# Patient Record
Sex: FEMALE | Race: WHITE | NOT HISPANIC OR LATINO | URBAN - METROPOLITAN AREA
[De-identification: names, ages, dates, MRNs, and addresses within clinical notes are randomized per-mention and may not be internally consistent; named-entity substitution may affect disease eponyms.]

---

## 2018-04-04 ENCOUNTER — OUTPATIENT (OUTPATIENT)
Dept: OUTPATIENT SERVICES | Facility: HOSPITAL | Age: 48
LOS: 1 days | End: 2018-04-04
Payer: COMMERCIAL

## 2018-04-04 VITALS
HEIGHT: 64 IN | RESPIRATION RATE: 20 BRPM | SYSTOLIC BLOOD PRESSURE: 110 MMHG | OXYGEN SATURATION: 98 % | TEMPERATURE: 99 F | WEIGHT: 126.99 LBS | HEART RATE: 91 BPM | DIASTOLIC BLOOD PRESSURE: 68 MMHG

## 2018-04-04 DIAGNOSIS — Z85.3 PERSONAL HISTORY OF MALIGNANT NEOPLASM OF BREAST: ICD-10-CM

## 2018-04-04 DIAGNOSIS — Z90.89 ACQUIRED ABSENCE OF OTHER ORGANS: Chronic | ICD-10-CM

## 2018-04-04 DIAGNOSIS — C50.919 MALIGNANT NEOPLASM OF UNSPECIFIED SITE OF UNSPECIFIED FEMALE BREAST: ICD-10-CM

## 2018-04-04 DIAGNOSIS — Z01.818 ENCOUNTER FOR OTHER PREPROCEDURAL EXAMINATION: ICD-10-CM

## 2018-04-04 DIAGNOSIS — Z92.89 PERSONAL HISTORY OF OTHER MEDICAL TREATMENT: Chronic | ICD-10-CM

## 2018-04-04 LAB
ANION GAP SERPL CALC-SCNC: 9 MMOL/L — SIGNIFICANT CHANGE UP (ref 5–17)
BUN SERPL-MCNC: 17 MG/DL — SIGNIFICANT CHANGE UP (ref 7–23)
CALCIUM SERPL-MCNC: 9.4 MG/DL — SIGNIFICANT CHANGE UP (ref 8.4–10.5)
CHLORIDE SERPL-SCNC: 102 MMOL/L — SIGNIFICANT CHANGE UP (ref 96–108)
CO2 SERPL-SCNC: 27 MMOL/L — SIGNIFICANT CHANGE UP (ref 22–31)
CREAT SERPL-MCNC: 0.93 MG/DL — SIGNIFICANT CHANGE UP (ref 0.5–1.3)
GLUCOSE SERPL-MCNC: 91 MG/DL — SIGNIFICANT CHANGE UP (ref 70–99)
HCT VFR BLD CALC: 39.6 % — SIGNIFICANT CHANGE UP (ref 34.5–45)
HGB BLD-MCNC: 12.6 G/DL — SIGNIFICANT CHANGE UP (ref 11.5–15.5)
MCHC RBC-ENTMCNC: 29.4 PG — SIGNIFICANT CHANGE UP (ref 27–34)
MCHC RBC-ENTMCNC: 31.8 GM/DL — LOW (ref 32–36)
MCV RBC AUTO: 92.5 FL — SIGNIFICANT CHANGE UP (ref 80–100)
NRBC # BLD: 0 /100 WBCS — SIGNIFICANT CHANGE UP (ref 0–0)
PLATELET # BLD AUTO: 317 K/UL — SIGNIFICANT CHANGE UP (ref 150–400)
POTASSIUM SERPL-MCNC: 3.9 MMOL/L — SIGNIFICANT CHANGE UP (ref 3.5–5.3)
POTASSIUM SERPL-SCNC: 3.9 MMOL/L — SIGNIFICANT CHANGE UP (ref 3.5–5.3)
RBC # BLD: 4.28 M/UL — SIGNIFICANT CHANGE UP (ref 3.8–5.2)
RBC # FLD: 13.3 % — SIGNIFICANT CHANGE UP (ref 10.3–14.5)
SODIUM SERPL-SCNC: 138 MMOL/L — SIGNIFICANT CHANGE UP (ref 135–145)
WBC # BLD: 6.77 K/UL — SIGNIFICANT CHANGE UP (ref 3.8–10.5)
WBC # FLD AUTO: 6.77 K/UL — SIGNIFICANT CHANGE UP (ref 3.8–10.5)

## 2018-04-04 PROCEDURE — G0463: CPT

## 2018-04-04 PROCEDURE — 85027 COMPLETE CBC AUTOMATED: CPT

## 2018-04-04 PROCEDURE — 80048 BASIC METABOLIC PNL TOTAL CA: CPT

## 2018-04-04 NOTE — H&P PST ADULT - RS GEN PE MLT RESP DETAILS PC
breath sounds equal/respirations non-labored/normal/airway patent/good air movement/clear to auscultation bilaterally

## 2018-04-04 NOTE — H&P PST ADULT - NSANTHOSAYNRD_GEN_A_CORE
No. LIZA screening performed.  STOP BANG Legend: 0-2 = LOW Risk; 3-4 = INTERMEDIATE Risk; 5-8 = HIGH Risk

## 2018-04-04 NOTE — H&P PST ADULT - MUSCULOSKELETAL
negative detailed exam normal/ROM intact/no calf tenderness/no joint swelling/no joint erythema/no joint warmth

## 2018-04-04 NOTE — H&P PST ADULT - ATTENDING COMMENTS
48 year old female recently diagnosed right breast multicentral breast cancer.  She presents to undergo a right areolar sparing total mastectomy, right axillary sentinel lymph node biopsy, possible right axillary lymph node dissection and tissue expander.

## 2018-04-04 NOTE — H&P PST ADULT - PROBLEM SELECTOR PLAN 1
Bilateral Total Mastectomy ,Bilateral Axillary [place] Lymph Node Biopsy, Possible Bilateral Axillary Lymph node Dissection ,Bilateral Tissue Expander with Alloderm  on 4/16/2018  -Pre- Op instructions discussed   -CBC , BMP sent  - [facility] ordered the DOS right Total Mastectomy ,right Axillary [place] Lymph Node Biopsy, Possible right Axillary Lymph node Dissection ,right Tissue Expander with Alloderm  on 4/16/2018  -Pre- Op instructions discussed   -CBC , BMP sent  - UCG ordered the DOS

## 2018-04-04 NOTE — H&P PST ADULT - HISTORY OF PRESENT ILLNESS
49 y/o female with no significant past medical history ,recently diagnosed with neoplasm of right breast . Presents to  PST for scheduled Bilateral Total Mastectomy ,Bilateral Axillary [place] Lymph Node Biopsy, Possible Bilateral Axillary Lymph node Dissection ,Bilateral Tissue Expander with Alloderm  on 4/16/2018 47 y/o female with no significant past medical history ,recently diagnosed with neoplasm of right breast . Presents to  PST for scheduled Right  Total Mastectomy ,right Axillary [place] Lymph Node Biopsy, Possible right Axillary Lymph node Dissection ,right Tissue Expander with Alloderm  on 4/16/2018

## 2018-04-12 ENCOUNTER — RESULT REVIEW (OUTPATIENT)
Age: 48
End: 2018-04-12

## 2018-04-15 ENCOUNTER — TRANSCRIPTION ENCOUNTER (OUTPATIENT)
Age: 48
End: 2018-04-15

## 2018-04-16 ENCOUNTER — RESULT REVIEW (OUTPATIENT)
Age: 48
End: 2018-04-16

## 2018-04-16 ENCOUNTER — APPOINTMENT (OUTPATIENT)
Dept: NUCLEAR MEDICINE | Facility: HOSPITAL | Age: 48
End: 2018-04-16

## 2018-04-16 ENCOUNTER — INPATIENT (INPATIENT)
Facility: HOSPITAL | Age: 48
LOS: 0 days | Discharge: HOME | DRG: 581 | End: 2018-04-17
Attending: SURGERY | Admitting: SURGERY
Payer: COMMERCIAL

## 2018-04-16 VITALS
HEIGHT: 64 IN | RESPIRATION RATE: 20 BRPM | TEMPERATURE: 98 F | DIASTOLIC BLOOD PRESSURE: 74 MMHG | OXYGEN SATURATION: 98 % | HEART RATE: 78 BPM | WEIGHT: 126.99 LBS | SYSTOLIC BLOOD PRESSURE: 104 MMHG

## 2018-04-16 DIAGNOSIS — Z85.3 PERSONAL HISTORY OF MALIGNANT NEOPLASM OF BREAST: ICD-10-CM

## 2018-04-16 DIAGNOSIS — Z90.89 ACQUIRED ABSENCE OF OTHER ORGANS: Chronic | ICD-10-CM

## 2018-04-16 DIAGNOSIS — Z92.89 PERSONAL HISTORY OF OTHER MEDICAL TREATMENT: Chronic | ICD-10-CM

## 2018-04-16 PROBLEM — Z00.00 ENCOUNTER FOR PREVENTIVE HEALTH EXAMINATION: Status: ACTIVE | Noted: 2018-04-16

## 2018-04-16 LAB — HCG UR QL: NEGATIVE — SIGNIFICANT CHANGE UP

## 2018-04-16 PROCEDURE — 88331 PATH CONSLTJ SURG 1 BLK 1SPC: CPT | Mod: 26

## 2018-04-16 PROCEDURE — 88305 TISSUE EXAM BY PATHOLOGIST: CPT | Mod: 26

## 2018-04-16 PROCEDURE — 88342 IMHCHEM/IMCYTCHM 1ST ANTB: CPT | Mod: 26

## 2018-04-16 PROCEDURE — 88307 TISSUE EXAM BY PATHOLOGIST: CPT | Mod: 26

## 2018-04-16 PROCEDURE — 88341 IMHCHEM/IMCYTCHM EA ADD ANTB: CPT | Mod: 26

## 2018-04-16 RX ORDER — OXYCODONE HYDROCHLORIDE 5 MG/1
5 TABLET ORAL EVERY 6 HOURS
Qty: 0 | Refills: 0 | Status: DISCONTINUED | OUTPATIENT
Start: 2018-04-16 | End: 2018-04-17

## 2018-04-16 RX ORDER — CEFAZOLIN SODIUM 1 G
2000 VIAL (EA) INJECTION ONCE
Qty: 0 | Refills: 0 | Status: COMPLETED | OUTPATIENT
Start: 2018-04-16 | End: 2018-04-16

## 2018-04-16 RX ORDER — HYDROMORPHONE HYDROCHLORIDE 2 MG/ML
0.5 INJECTION INTRAMUSCULAR; INTRAVENOUS; SUBCUTANEOUS EVERY 4 HOURS
Qty: 0 | Refills: 0 | Status: DISCONTINUED | OUTPATIENT
Start: 2018-04-16 | End: 2018-04-17

## 2018-04-16 RX ORDER — SODIUM CHLORIDE 9 MG/ML
3 INJECTION INTRAMUSCULAR; INTRAVENOUS; SUBCUTANEOUS EVERY 8 HOURS
Qty: 0 | Refills: 0 | Status: DISCONTINUED | OUTPATIENT
Start: 2018-04-16 | End: 2018-04-16

## 2018-04-16 RX ORDER — OXYCODONE HYDROCHLORIDE 5 MG/1
10 TABLET ORAL EVERY 6 HOURS
Qty: 0 | Refills: 0 | Status: DISCONTINUED | OUTPATIENT
Start: 2018-04-16 | End: 2018-04-17

## 2018-04-16 RX ORDER — HEPARIN SODIUM 5000 [USP'U]/ML
5000 INJECTION INTRAVENOUS; SUBCUTANEOUS EVERY 8 HOURS
Qty: 0 | Refills: 0 | Status: DISCONTINUED | OUTPATIENT
Start: 2018-04-16 | End: 2018-04-17

## 2018-04-16 RX ORDER — DOCUSATE SODIUM 100 MG
100 CAPSULE ORAL THREE TIMES A DAY
Qty: 0 | Refills: 0 | Status: DISCONTINUED | OUTPATIENT
Start: 2018-04-16 | End: 2018-04-17

## 2018-04-16 RX ORDER — ONDANSETRON 8 MG/1
4 TABLET, FILM COATED ORAL ONCE
Qty: 0 | Refills: 0 | Status: DISCONTINUED | OUTPATIENT
Start: 2018-04-16 | End: 2018-04-16

## 2018-04-16 RX ORDER — HYDROMORPHONE HYDROCHLORIDE 2 MG/ML
0.5 INJECTION INTRAMUSCULAR; INTRAVENOUS; SUBCUTANEOUS
Qty: 0 | Refills: 0 | Status: DISCONTINUED | OUTPATIENT
Start: 2018-04-16 | End: 2018-04-16

## 2018-04-16 RX ORDER — ACETAMINOPHEN 500 MG
975 TABLET ORAL EVERY 8 HOURS
Qty: 0 | Refills: 0 | Status: DISCONTINUED | OUTPATIENT
Start: 2018-04-17 | End: 2018-04-17

## 2018-04-16 RX ORDER — CEFAZOLIN SODIUM 1 G
2000 VIAL (EA) INJECTION EVERY 8 HOURS
Qty: 0 | Refills: 0 | Status: COMPLETED | OUTPATIENT
Start: 2018-04-16 | End: 2018-04-17

## 2018-04-16 RX ORDER — SODIUM CHLORIDE 9 MG/ML
1000 INJECTION, SOLUTION INTRAVENOUS
Qty: 0 | Refills: 0 | Status: DISCONTINUED | OUTPATIENT
Start: 2018-04-16 | End: 2018-04-17

## 2018-04-16 RX ORDER — LIDOCAINE HCL 20 MG/ML
0.2 VIAL (ML) INJECTION ONCE
Qty: 0 | Refills: 0 | Status: COMPLETED | OUTPATIENT
Start: 2018-04-16 | End: 2018-04-16

## 2018-04-16 RX ADMIN — HEPARIN SODIUM 5000 UNIT(S): 5000 INJECTION INTRAVENOUS; SUBCUTANEOUS at 22:17

## 2018-04-16 RX ADMIN — HYDROMORPHONE HYDROCHLORIDE 0.5 MILLIGRAM(S): 2 INJECTION INTRAMUSCULAR; INTRAVENOUS; SUBCUTANEOUS at 18:20

## 2018-04-16 RX ADMIN — Medication 100 MILLIGRAM(S): at 22:16

## 2018-04-16 RX ADMIN — SODIUM CHLORIDE 75 MILLILITER(S): 9 INJECTION, SOLUTION INTRAVENOUS at 19:37

## 2018-04-16 RX ADMIN — Medication 100 MILLIGRAM(S): at 22:17

## 2018-04-16 RX ADMIN — HYDROMORPHONE HYDROCHLORIDE 0.5 MILLIGRAM(S): 2 INJECTION INTRAMUSCULAR; INTRAVENOUS; SUBCUTANEOUS at 18:05

## 2018-04-16 NOTE — PROGRESS NOTE ADULT - SUBJECTIVE AND OBJECTIVE BOX
Surgery Post-Op Note    Procedure: Mastectomy, with axillary sentinel node biopsy    Surgeon: Dr. Palleschi    Subjective:   Pt seen and examined at the bedside. Pt w/ no complaints. Denies F/C/N/V. Pain controlled with medication.     Vital Signs Last 24 Hrs  T(C): 37.4 (16 Apr 2018 18:30), Max: 37.4 (16 Apr 2018 17:30)  T(F): 99.3 (16 Apr 2018 18:30), Max: 99.3 (16 Apr 2018 17:30)  HR: 72 (16 Apr 2018 19:30) (72 - 94)  BP: 90/52 (16 Apr 2018 19:30) (90/52 - 104/74)  BP(mean): 65 (16 Apr 2018 19:30) (65 - 71)  RR: 16 (16 Apr 2018 19:30) (14 - 20)  SpO2: 96% (16 Apr 2018 19:30) (95% - 99%)    Physical Exam:  General: NAD, resting comfortably in bed  Neuro: A/O x 3, no focal deficits  Pulmonary: Nonlabored breathing, no respiratory distress  Cardiovascular: NSR  Chest: breast incisions c,d,i.  No hematoma appreciated.  RADU x2 with serosanguinous output  Abdominal: soft, NT. ND  Extremities: WWP        Assessment:48y Female s/p Right Mastectomy, with axillary sentinel node biopsy with plastics recon.  Plan:  IVF, Diet: Advance as tolerated  Pain/nausea control PRN  f/u TOV  Incentive spirometer/OOB/Ambulate

## 2018-04-16 NOTE — BRIEF OPERATIVE NOTE - PRE-OP DX
Malignant neoplasm of upper-inner quadrant of right breast in female, estrogen receptor positive  04/16/2018  multicentric, right 1:00 and right 2-3:00  Active  Palleschi, Susan M

## 2018-04-16 NOTE — BRIEF OPERATIVE NOTE - PROCEDURE
<<-----Click on this checkbox to enter Procedure Mastectomy, with axillary sentinel node biopsy  04/16/2018  right, areolar sparing  [facility]  [facility]

## 2018-04-16 NOTE — BRIEF OPERATIVE NOTE - SPECIMENS
right axillary sentinel lymph nodes x 3, right breast, right breast core biopsy scars x 2 (1:00, 2-3:00)

## 2018-04-17 ENCOUNTER — TRANSCRIPTION ENCOUNTER (OUTPATIENT)
Age: 48
End: 2018-04-17

## 2018-04-17 VITALS
OXYGEN SATURATION: 97 % | DIASTOLIC BLOOD PRESSURE: 58 MMHG | HEART RATE: 67 BPM | RESPIRATION RATE: 16 BRPM | TEMPERATURE: 99 F | SYSTOLIC BLOOD PRESSURE: 94 MMHG

## 2018-04-17 PROCEDURE — C1789: CPT

## 2018-04-17 PROCEDURE — A9541: CPT

## 2018-04-17 PROCEDURE — 88307 TISSUE EXAM BY PATHOLOGIST: CPT

## 2018-04-17 PROCEDURE — 88305 TISSUE EXAM BY PATHOLOGIST: CPT

## 2018-04-17 PROCEDURE — 81025 URINE PREGNANCY TEST: CPT

## 2018-04-17 PROCEDURE — 88331 PATH CONSLTJ SURG 1 BLK 1SPC: CPT

## 2018-04-17 PROCEDURE — 88341 IMHCHEM/IMCYTCHM EA ADD ANTB: CPT

## 2018-04-17 PROCEDURE — 99261: CPT

## 2018-04-17 RX ORDER — OXYCODONE HYDROCHLORIDE 5 MG/1
5 TABLET ORAL EVERY 4 HOURS
Qty: 0 | Refills: 0 | Status: DISCONTINUED | OUTPATIENT
Start: 2018-04-17 | End: 2018-04-17

## 2018-04-17 RX ORDER — ACETAMINOPHEN 500 MG
3 TABLET ORAL
Qty: 0 | Refills: 0 | DISCHARGE
Start: 2018-04-17

## 2018-04-17 RX ORDER — DOCUSATE SODIUM 100 MG
1 CAPSULE ORAL
Qty: 0 | Refills: 0 | DISCHARGE
Start: 2018-04-17

## 2018-04-17 RX ADMIN — Medication 100 MILLIGRAM(S): at 05:16

## 2018-04-17 RX ADMIN — HEPARIN SODIUM 5000 UNIT(S): 5000 INJECTION INTRAVENOUS; SUBCUTANEOUS at 05:16

## 2018-04-17 RX ADMIN — OXYCODONE HYDROCHLORIDE 5 MILLIGRAM(S): 5 TABLET ORAL at 05:45

## 2018-04-17 RX ADMIN — OXYCODONE HYDROCHLORIDE 5 MILLIGRAM(S): 5 TABLET ORAL at 05:16

## 2018-04-17 RX ADMIN — OXYCODONE HYDROCHLORIDE 10 MILLIGRAM(S): 5 TABLET ORAL at 08:55

## 2018-04-17 RX ADMIN — Medication 975 MILLIGRAM(S): at 09:03

## 2018-04-17 RX ADMIN — Medication 975 MILLIGRAM(S): at 00:32

## 2018-04-17 RX ADMIN — Medication 975 MILLIGRAM(S): at 01:09

## 2018-04-17 RX ADMIN — Medication 975 MILLIGRAM(S): at 08:33

## 2018-04-17 RX ADMIN — OXYCODONE HYDROCHLORIDE 10 MILLIGRAM(S): 5 TABLET ORAL at 09:25

## 2018-04-17 NOTE — PROGRESS NOTE ADULT - ASSESSMENT
48F s/p areola sparing mastectomy on right and reconstruction with tissue expander. POD 1.  >> Doing well.  >> Regular diet.  >> Ambulate as tolerated.  >> HOD elevation  >> Continue surgical bra  >> Dispo. planning from PRS point of view  >> Patient has prescriptions from her preoperative appointment  >> Follow up in Dr. Boss's office on Thursday.    [facility] Plastic Surgery Pager -- [phone]  [facility] Plastic Surgery Pager -- [phone]

## 2018-04-17 NOTE — DISCHARGE NOTE ADULT - PATIENT PORTAL LINK FT
You can access the [facility], offered by [facility], by registering with the following website: [email or web address]

## 2018-04-17 NOTE — PROGRESS NOTE ADULT - SUBJECTIVE AND OBJECTIVE BOX
SUBJECTIVE:  Doing well.   No overnight events.   Tolerating diet.  No complaints.    OBJECTIVE:     ** VITAL SIGNS / I&O's **    T(C): 37 (04-17-18 @ 08:54), Max: 37.4 (04-16-18 @ 17:30)  T(F): 98.6 (04-17-18 @ 08:54), Max: 99.3 (04-16-18 @ 17:30)  HR: 67 (04-17-18 @ 08:54) (67 - 94)  BP: 94/58 (04-17-18 @ 08:54) (90/52 - 104/74)  RR: 16 (04-17-18 @ 08:54) (14 - 20)  SpO2: 97% (04-17-18 @ 08:54) (95% - 99%)      16 Apr 2018 07:01  -  17 Apr 2018 07:00  --------------------------------------------------------  IN:    IV PiggyBack: 100 mL    lactated ringers.: 1125 mL    Oral Fluid: 120 mL  Total IN: 1345 mL    OUT:    Bulb: 10 mL    Bulb: 80 mL    Voided: 800 mL  Total OUT: 890 mL    Total NET: 455 mL      17 Apr 2018 07:01  -  17 Apr 2018 09:46  --------------------------------------------------------  IN:  Total IN: 0 mL    OUT:    Bulb: 50 mL  Total OUT: 50 mL    Total NET: -50 mL          ** PHYSICAL EXAM **     -- CONSTITUTIONAL: AOx3. NAD.   -- RIGHT BREAST: Mastectomy skin flap ecchymosis, minimal. No collections. Areola complex viable. Drain(s) serosanguinous.  -- CARDIOVASCULAR: Regular rate and rhythm. S1, S2.  -- RESPIRATORY: Bilateral breath sounds.   -- ABDOMEN: Soft. Non-tender. Non-distended.

## 2018-04-17 NOTE — DISCHARGE NOTE ADULT - MEDICATION SUMMARY - MEDICATIONS TO TAKE
I will START or STAY ON the medications listed below when I get home from the hospital:    acetaminophen 325 mg oral tablet  -- 3 tab(s) by mouth every 8 hours  -- Indication: For Mild pain    docusate sodium 100 mg oral capsule  -- 1 cap(s) by mouth 3 times a day  -- Indication: For Constipation as needed

## 2018-04-17 NOTE — PROGRESS NOTE ADULT - ASSESSMENT
Stable POD 1  Healing well  Feels ready to go home  Continue drains  Keep bra on and dry  Follow-up with Kaye at Dr. Boss's office Thursday  Reviewed with RN and surgery team

## 2018-04-17 NOTE — PROGRESS NOTE ADULT - ASSESSMENT
Assessment:48y Female s/p Right Mastectomy, with axillary sentinel node biopsy with plastics reconstruction.    Plan:  IVF, Diet: Advance as tolerated  Pain/nausea control PRN  f/u TOV  Incentive spirometer/OOB/Ambulate

## 2018-04-17 NOTE — DISCHARGE NOTE ADULT - PLAN OF CARE
return to daily living activity prior to surgery, postoperative recovery WOUND CARE:   BATHING: Please do not submerge wound underwater. You may shower and/or sponge bathe.  ACTIVITY: No heavy lifting or straining. Otherwise, you may return to your usual level of physical activity. If you are taking narcotic pain medication (such as Percocet), do NOT drive a car, operate machinery or make important decisions.  DIET: Return to your usual diet.  NOTIFY YOUR SURGEON IF: You have any bleeding that does not stop, any pus draining from your wound, any fever (over 100.4 F) or chills, persistent nausea/vomiting, persistent diarrhea, or if your pain is not controlled on your discharge pain medications.  FOLLOW-UP:  1. Follow-up with Plastic Surgery, Kaye at Dr. Boss's office on Thursday.  Please call office to confirm your appointment. Follow-up with general surgery in 1-2 weeks.  2. Please follow up with your primary care physician in one week regarding your hospitalization.

## 2018-04-17 NOTE — DISCHARGE NOTE ADULT - CARE PROVIDER_API CALL
Lavell Boss), Plastic Surgery  [address]  [address]  [place] [address]  Phone: [phone]  Fax: [phone]    Palleschi, Susan M (MD), Surgery  [address]  [address]  [place] [address]  Phone: [phone]  Fax: [phone]

## 2018-04-17 NOTE — DISCHARGE NOTE ADULT - CARE PLAN
Principal Discharge DX:	Malignant neoplasm of breast (female)  Goal:	return to daily living activity prior to surgery, postoperative recovery  Assessment and plan of treatment:	WOUND CARE:   BATHING: Please do not submerge wound underwater. You may shower and/or sponge bathe.  ACTIVITY: No heavy lifting or straining. Otherwise, you may return to your usual level of physical activity. If you are taking narcotic pain medication (such as Percocet), do NOT drive a car, operate machinery or make important decisions.  DIET: Return to your usual diet.  NOTIFY YOUR SURGEON IF: You have any bleeding that does not stop, any pus draining from your wound, any fever (over 100.4 F) or chills, persistent nausea/vomiting, persistent diarrhea, or if your pain is not controlled on your discharge pain medications.  FOLLOW-UP:  1. Follow-up with Plastic Surgery, Kaye at Dr. Boss's office on Thursday.  Please call office to confirm your appointment. Follow-up with general surgery in 1-2 weeks.  2. Please follow up with your primary care physician in one week regarding your hospitalization.

## 2018-04-17 NOTE — PROGRESS NOTE ADULT - SUBJECTIVE AND OBJECTIVE BOX
S: Patient with no events overnight; denies fevers, chills, nausea, emesis, chest pain, SOB.    O: Vital Signs Last 24 Hrs  T(C): 36.6 (17 Apr 2018 05:14), Max: 37.4 (16 Apr 2018 17:30)  T(F): 97.9 (17 Apr 2018 05:14), Max: 99.3 (16 Apr 2018 17:30)  HR: 71 (17 Apr 2018 05:14) (71 - 94)  BP: 94/58 (17 Apr 2018 05:14) (90/52 - 104/74)  BP(mean): 68 (16 Apr 2018 21:30) (62 - 71)  RR: 18 (17 Apr 2018 05:14) (14 - 20)  SpO2: 97% (17 Apr 2018 05:14) (95% - 99%)  I&O's Detail    16 Apr 2018 07:01  -  17 Apr 2018 05:54  --------------------------------------------------------  IN:    IV PiggyBack: 100 mL    lactated ringers.: 1125 mL    Oral Fluid: 120 mL  Total IN: 1345 mL    OUT:    Bulb: 40 mL    Voided: 800 mL  Total OUT: 840 mL    Total NET: 505 mL        General: alert and oriented, NAD  Resp: airway patent, respirations unlabored, incision CDI JPx2 SS  CVS: regular rate and rhythm  Abdomen: soft, nontender, nondistended  Extremities: no edema  Skin: warm, dry, appropriate color

## 2018-04-17 NOTE — PROGRESS NOTE ADULT - SUBJECTIVE AND OBJECTIVE BOX
NAD. Pain controlled with PO meds.  Afebrile, VSS  Right breast closures intact. Skin viable.  TE in good position.  Drains serosang.

## 2018-04-20 LAB — SURGICAL PATHOLOGY STUDY: SIGNIFICANT CHANGE UP

## 2018-08-13 ENCOUNTER — RESULT REVIEW (OUTPATIENT)
Age: 48
End: 2018-08-13

## 2019-09-06 ENCOUNTER — RESULT REVIEW (OUTPATIENT)
Age: 49
End: 2019-09-06

## 2020-09-03 ENCOUNTER — TRANSCRIPTION ENCOUNTER (OUTPATIENT)
Age: 50
End: 2020-09-03

## 2020-11-19 NOTE — H&P PST ADULT - VENOUS THROMBOEMBOLISM AGE
POST ANESTHESIA EVALUATION    24y Female POSTOP DAY 1 S/P     MENTAL STATUS: Patient participation [ x ] Awake     [  ] Arousable     [  ] Sedated    AIRWAY PATENCY: [ x ] Satisfactory  [  ] Other:     Vital Signs Last 24 Hrs  T(C): 36.8 (19 Nov 2020 11:00), Max: 36.8 (18 Nov 2020 15:11)  T(F): 98.2 (19 Nov 2020 11:00), Max: 98.2 (18 Nov 2020 15:11)  HR: 66 (19 Nov 2020 12:00) (49 - 92)  BP: 99/65 (19 Nov 2020 08:00) (96/50 - 101/61)  BP(mean): 73 (19 Nov 2020 08:00) (61 - 73)  RR: 13 (19 Nov 2020 12:00) (10 - 22)  SpO2: 100% (19 Nov 2020 12:00) (98% - 100%)  I&O's Summary    18 Nov 2020 07:01  -  19 Nov 2020 07:00  --------------------------------------------------------  IN: 1752 mL / OUT: 1405 mL / NET: 347 mL    19 Nov 2020 07:01  -  19 Nov 2020 13:20  --------------------------------------------------------  IN: 594.8 mL / OUT: 784 mL / NET: -189.2 mL          NAUSEA/ VOMITTING:  [  ] NONE  [ x ] CONTROLLED [  ] OTHER     PAIN: [  x] CONTROLLED WITH CURRENT REGIMEN  [  ] OTHER    [ x ] NO APPARENT ANESTHESIA COMPLICATIONS      Comments:
40-59 yrs

## 2021-02-11 ENCOUNTER — RESULT REVIEW (OUTPATIENT)
Age: 51
End: 2021-02-11

## 2022-12-02 NOTE — H&P PST ADULT - BP NONINVASIVE SYSTOLIC (MM HG)
Patient's mom, Celina is concern about Umza's lab results. Mom is with Uzma right now at her school.  She  would like to speak with  Dr. Bridges's partner today, if possible  since Dr. Bridges will not be back in the office until Monday.    Thank you.     110

## 2022-12-17 ENCOUNTER — EMERGENCY (EMERGENCY)
Facility: HOSPITAL | Age: 52
LOS: 1 days | Discharge: HOME | End: 2022-12-17
Attending: EMERGENCY MEDICINE
Payer: COMMERCIAL

## 2022-12-17 VITALS
SYSTOLIC BLOOD PRESSURE: 137 MMHG | RESPIRATION RATE: 18 BRPM | DIASTOLIC BLOOD PRESSURE: 86 MMHG | HEART RATE: 91 BPM | OXYGEN SATURATION: 98 % | TEMPERATURE: 98 F | WEIGHT: 126.99 LBS | HEIGHT: 64 IN

## 2022-12-17 VITALS
DIASTOLIC BLOOD PRESSURE: 63 MMHG | RESPIRATION RATE: 16 BRPM | OXYGEN SATURATION: 97 % | SYSTOLIC BLOOD PRESSURE: 100 MMHG | HEART RATE: 72 BPM | TEMPERATURE: 99 F

## 2022-12-17 DIAGNOSIS — Z90.89 ACQUIRED ABSENCE OF OTHER ORGANS: Chronic | ICD-10-CM

## 2022-12-17 DIAGNOSIS — Z92.89 PERSONAL HISTORY OF OTHER MEDICAL TREATMENT: Chronic | ICD-10-CM

## 2022-12-17 LAB
ALBUMIN SERPL ELPH-MCNC: 4 G/DL — SIGNIFICANT CHANGE UP (ref 3.3–5)
ALP SERPL-CCNC: 34 U/L — LOW (ref 40–120)
ALT FLD-CCNC: 13 U/L — SIGNIFICANT CHANGE UP (ref 10–45)
ANION GAP SERPL CALC-SCNC: 13 MMOL/L — SIGNIFICANT CHANGE UP (ref 5–17)
APPEARANCE UR: CLEAR — SIGNIFICANT CHANGE UP
AST SERPL-CCNC: 16 U/L — SIGNIFICANT CHANGE UP (ref 10–40)
BACTERIA # UR AUTO: NEGATIVE — SIGNIFICANT CHANGE UP
BASOPHILS # BLD AUTO: 0.03 K/UL — SIGNIFICANT CHANGE UP (ref 0–0.2)
BASOPHILS NFR BLD AUTO: 0.4 % — SIGNIFICANT CHANGE UP (ref 0–2)
BILIRUB SERPL-MCNC: 0.2 MG/DL — SIGNIFICANT CHANGE UP (ref 0.2–1.2)
BILIRUB UR-MCNC: NEGATIVE — SIGNIFICANT CHANGE UP
BUN SERPL-MCNC: 16 MG/DL — SIGNIFICANT CHANGE UP (ref 7–23)
CALCIUM SERPL-MCNC: 8.7 MG/DL — SIGNIFICANT CHANGE UP (ref 8.4–10.5)
CHLORIDE SERPL-SCNC: 104 MMOL/L — SIGNIFICANT CHANGE UP (ref 96–108)
CO2 SERPL-SCNC: 22 MMOL/L — SIGNIFICANT CHANGE UP (ref 22–31)
COLOR SPEC: COLORLESS — SIGNIFICANT CHANGE UP
CREAT SERPL-MCNC: 0.97 MG/DL — SIGNIFICANT CHANGE UP (ref 0.5–1.3)
DIFF PNL FLD: ABNORMAL
EGFR: 71 ML/MIN/1.73M2 — SIGNIFICANT CHANGE UP
EOSINOPHIL # BLD AUTO: 0.05 K/UL — SIGNIFICANT CHANGE UP (ref 0–0.5)
EOSINOPHIL NFR BLD AUTO: 0.6 % — SIGNIFICANT CHANGE UP (ref 0–6)
EPI CELLS # UR: 0 /HPF — SIGNIFICANT CHANGE UP
GLUCOSE SERPL-MCNC: 104 MG/DL — HIGH (ref 70–99)
GLUCOSE UR QL: NEGATIVE — SIGNIFICANT CHANGE UP
HCT VFR BLD CALC: 38.8 % — SIGNIFICANT CHANGE UP (ref 34.5–45)
HGB BLD-MCNC: 12.7 G/DL — SIGNIFICANT CHANGE UP (ref 11.5–15.5)
HYALINE CASTS # UR AUTO: 0 /LPF — SIGNIFICANT CHANGE UP (ref 0–2)
IMM GRANULOCYTES NFR BLD AUTO: 0.2 % — SIGNIFICANT CHANGE UP (ref 0–0.9)
KETONES UR-MCNC: NEGATIVE — SIGNIFICANT CHANGE UP
LEUKOCYTE ESTERASE UR-ACNC: NEGATIVE — SIGNIFICANT CHANGE UP
LYMPHOCYTES # BLD AUTO: 1.1 K/UL — SIGNIFICANT CHANGE UP (ref 1–3.3)
LYMPHOCYTES # BLD AUTO: 13.5 % — SIGNIFICANT CHANGE UP (ref 13–44)
MCHC RBC-ENTMCNC: 29.3 PG — SIGNIFICANT CHANGE UP (ref 27–34)
MCHC RBC-ENTMCNC: 32.7 GM/DL — SIGNIFICANT CHANGE UP (ref 32–36)
MCV RBC AUTO: 89.6 FL — SIGNIFICANT CHANGE UP (ref 80–100)
MONOCYTES # BLD AUTO: 0.62 K/UL — SIGNIFICANT CHANGE UP (ref 0–0.9)
MONOCYTES NFR BLD AUTO: 7.6 % — SIGNIFICANT CHANGE UP (ref 2–14)
NEUTROPHILS # BLD AUTO: 6.33 K/UL — SIGNIFICANT CHANGE UP (ref 1.8–7.4)
NEUTROPHILS NFR BLD AUTO: 77.7 % — HIGH (ref 43–77)
NITRITE UR-MCNC: NEGATIVE — SIGNIFICANT CHANGE UP
NRBC # BLD: 0 /100 WBCS — SIGNIFICANT CHANGE UP (ref 0–0)
PH UR: 6.5 — SIGNIFICANT CHANGE UP (ref 5–8)
PLATELET # BLD AUTO: 242 K/UL — SIGNIFICANT CHANGE UP (ref 150–400)
POTASSIUM SERPL-MCNC: 3.5 MMOL/L — SIGNIFICANT CHANGE UP (ref 3.5–5.3)
POTASSIUM SERPL-SCNC: 3.5 MMOL/L — SIGNIFICANT CHANGE UP (ref 3.5–5.3)
PROT SERPL-MCNC: 6.6 G/DL — SIGNIFICANT CHANGE UP (ref 6–8.3)
PROT UR-MCNC: NEGATIVE — SIGNIFICANT CHANGE UP
RBC # BLD: 4.33 M/UL — SIGNIFICANT CHANGE UP (ref 3.8–5.2)
RBC # FLD: 13.2 % — SIGNIFICANT CHANGE UP (ref 10.3–14.5)
RBC CASTS # UR COMP ASSIST: 1 /HPF — SIGNIFICANT CHANGE UP (ref 0–4)
SODIUM SERPL-SCNC: 139 MMOL/L — SIGNIFICANT CHANGE UP (ref 135–145)
SP GR SPEC: 1.01 — LOW (ref 1.01–1.02)
UROBILINOGEN FLD QL: NEGATIVE — SIGNIFICANT CHANGE UP
WBC # BLD: 8.15 K/UL — SIGNIFICANT CHANGE UP (ref 3.8–10.5)
WBC # FLD AUTO: 8.15 K/UL — SIGNIFICANT CHANGE UP (ref 3.8–10.5)
WBC UR QL: 0 /HPF — SIGNIFICANT CHANGE UP (ref 0–5)

## 2022-12-17 PROCEDURE — 99284 EMERGENCY DEPT VISIT MOD MDM: CPT | Mod: 25

## 2022-12-17 PROCEDURE — 51702 INSERT TEMP BLADDER CATH: CPT | Mod: XU

## 2022-12-17 PROCEDURE — 36415 COLL VENOUS BLD VENIPUNCTURE: CPT

## 2022-12-17 PROCEDURE — 81001 URINALYSIS AUTO W/SCOPE: CPT

## 2022-12-17 PROCEDURE — 76770 US EXAM ABDO BACK WALL COMP: CPT | Mod: 26

## 2022-12-17 PROCEDURE — 99285 EMERGENCY DEPT VISIT HI MDM: CPT

## 2022-12-17 PROCEDURE — 76770 US EXAM ABDO BACK WALL COMP: CPT

## 2022-12-17 PROCEDURE — 87086 URINE CULTURE/COLONY COUNT: CPT

## 2022-12-17 PROCEDURE — 80053 COMPREHEN METABOLIC PANEL: CPT

## 2022-12-17 PROCEDURE — 85025 COMPLETE CBC W/AUTO DIFF WBC: CPT

## 2022-12-17 NOTE — ED PROVIDER NOTE - NSFOLLOWUPINSTRUCTIONS_ED_ALL_ED_FT
You were seen in the Emergency Department for urinary retention. Your blood work did not show any emergent medical problems that require hospitalization or inpatient treatment. Follow up with urology in 7 days for trial of void and coleman removal as discussed.     1) Continue all previously prescribed medications as directed.    2) Follow up with your primary care physician - take copies of your results.    3) Return to the Emergency Department for worsening or persistent symptoms, and/or ANY NEW OR CONCERNING SYMPTOMS.

## 2022-12-17 NOTE — ED PROVIDER NOTE - PROGRESS NOTE DETAILS
LILY Aguero (PGY-3) - pt w/ 1L out in coleman. Labs not actionable. Observed in ED w/o further excessive diuresis. Will dc w/ uro follow up for trial of void.

## 2022-12-17 NOTE — ED PROVIDER NOTE - NSFOLLOWUPCLINICS_GEN_ALL_ED_FT
[facility]  Urology  [address] [place] [address] - 3rd [place] [address]  Phone: [phone]  Fax:

## 2022-12-17 NOTE — ED ADULT TRIAGE NOTE - CHIEF COMPLAINT QUOTE
retention since this afternoon, also reports spotting x 1 week, history of fibroids. reports several episodes over the last few months.

## 2022-12-17 NOTE — ED PROVIDER NOTE - NSICDXPASTMEDICALHX_GEN_ALL_CORE_FT
PAST MEDICAL HISTORY:  Malignant neoplasm of breast (female) Right Dx  2/2018    Pneumonia history - 2015 treated with ABT

## 2022-12-17 NOTE — ED PROVIDER NOTE - PATIENT PORTAL LINK FT
You can access the FollowMyHealth Patient Portal offered by [facility] by registering at the following website: [email or web address]. By joining [employer]’s FollowMyHealth portal, you will also be able to view your health information using other applications (apps) compatible with our system.

## 2022-12-17 NOTE — ED PROVIDER NOTE - CLINICAL SUMMARY MEDICAL DECISION MAKING FREE TEXT BOX
Attending MD Chavez:   52-year-old woman with a history of breast cancer, fibroids presenting for evaluation of difficulty urinating for several hours today.  Patient is having pelvic pressure and vaginal spotting associated with her difficulty urinating.  She denies any hematuria prior to her difficulty urinating.  There are no fevers or chills.  She has no back pain or lower extremity paresthesias.  She has never required a Clark catheter for urinary retention in the past.  She states she had a similar episode of near urinary retention several weeks ago that resolved on its own without presenting to the hospital.    Vital signs within normal limits.  The patient is uncomfortable appearing shifting in the bed frequently.  She is awake and alert oriented x3.  Abdomen with suprapubic fullness and tenderness otherwise nontender.  There is no CVA tenderness.  Patient moves all extremities spontaneously with full strength.    Point-of-care ultrasound reveals about 600 cc or more in the bladder.  Will place Clark catheter for acute urinary retention.  Will obtain screening urinalysis to rule out cystitis, screening labs to rule out significant renal dysfunction.  Etiology of urinary retention may be related to patient's known fibroid uterus.      *The above represents an initial assessment/impression. Please refer to progress notes for potential changes in patient clinical course*

## 2022-12-17 NOTE — ED ADULT NURSE NOTE - OBJECTIVE STATEMENT
52F aaox4 ambulatory with h/o Uterine fibroids and rt breast cancer with mastectomy and left breast reconstruction surgery p/w c/o acute urinary retention. Last urination was only few drops this afternoon, in the ED patient c/o 10/10 pain in the bladder, unable to sit because of the pain. On exam, bladder hard and distended. US done by Dr Chavez with >600 in the sonogram. [place] 16 Urinary catheter inserted aseptically, UA and labs sent pending results.   Patient verbalized improvement after the catheterization.

## 2022-12-17 NOTE — ED PROVIDER NOTE - ATTENDING CONTRIBUTION TO CARE
Attending MD Chavez:  I personally have seen and examined this patient. I have performed a substantive portion of the visit including all aspects of the medical decision making.  Resident note reviewed and agree on plan of care and except where noted.              *The above represents an initial assessment/impression. Please refer to progress notes for potential changes in patient clinical course*

## 2022-12-17 NOTE — ED PROVIDER NOTE - OBJECTIVE STATEMENT
52-year-old female history of right-sided breast cancer status post mastectomy on tamoxifen, fibroids here for urinary retention.  Patient states that she has had urinary retention before 2 times without seeking medical attention.  Today last urinated normal at 1 PM and then started having abdominal pain around 2:30 PM.  Denies back pain, dysuria.  She has never seen a urologist.  Otherwise denies fever, chest pain, shortness of breath, nausea vomiting.

## 2022-12-17 NOTE — ED PROVIDER NOTE - PHYSICAL EXAMINATION
General: NAD  HEENT: NCAT  Cardiac: RRR, 2+ radial pulses  Chest: CTA  Abdomen: soft, +ttp suprapubically -cva ttp  Extremities: no peripheral edema or calf tenderness  Skin: no rashes  Neuro: AAOx3, motor and sensory grossly intact  Psych: mood and affect appropriate

## 2022-12-18 PROBLEM — J18.9 PNEUMONIA, UNSPECIFIED ORGANISM: Chronic | Status: ACTIVE | Noted: 2018-04-04

## 2022-12-18 PROBLEM — C50.919 MALIGNANT NEOPLASM OF UNSPECIFIED SITE OF UNSPECIFIED FEMALE BREAST: Chronic | Status: ACTIVE | Noted: 2018-04-04

## 2022-12-18 LAB
CULTURE RESULTS: NO GROWTH — SIGNIFICANT CHANGE UP
SPECIMEN SOURCE: SIGNIFICANT CHANGE UP

## 2023-06-09 ENCOUNTER — APPOINTMENT (OUTPATIENT)
Dept: OBGYN | Facility: CLINIC | Age: 53
End: 2023-06-09
Payer: COMMERCIAL

## 2023-06-09 VITALS
SYSTOLIC BLOOD PRESSURE: 108 MMHG | BODY MASS INDEX: 22.2 KG/M2 | DIASTOLIC BLOOD PRESSURE: 74 MMHG | HEIGHT: 64 IN | WEIGHT: 130 LBS

## 2023-06-09 DIAGNOSIS — D25.0 INTRAMURAL LEIOMYOMA OF UTERUS: ICD-10-CM

## 2023-06-09 DIAGNOSIS — D25.1 INTRAMURAL LEIOMYOMA OF UTERUS: ICD-10-CM

## 2023-06-09 PROCEDURE — 99203 OFFICE O/P NEW LOW 30 MIN: CPT

## 2023-06-13 NOTE — PLAN
Patient returning call      [FreeTextEntry1] : Discussed the option of continued conservative observation of fibroids vs surgical removal. Treatment options of myomectomy, hysterectomy, ufe and continued observation were also outlined. A detailed discussion regarding the option of myomectomy vs hysterectomy was also held and the risks, benefits, and alternatives provided. All questions answered and pt to notify.\par pt to observe at this time and to have fu pelvic sono.\par During this visit 40 minutes were spent face-to-face with greater than 50% of the time dedicated to counseling.\par

## 2023-06-13 NOTE — PHYSICAL EXAM
[Chaperone Present] : A chaperone was present in the examining room during all aspects of the physical examination [FreeTextEntry1] : laura [Soft] : soft [Non-tender] : non-tender [No Mass] : no mass [Labia Majora] : normal [Labia Minora] : normal [Normal] : normal [Uterine Adnexae] : normal [FreeTextEntry6] : 16 wks mobile

## 2023-06-13 NOTE — HISTORY OF PRESENT ILLNESS
[FreeTextEntry1] : 54yo  LMP: 2023 presenting for consultation for uterine fibroids. The patient was first diagnosed with fibroids ~2yrs ago during breast cancer work up. Of note she was dx with breast CA 2018, s/p right mastectomy  and placed on Tamoxifen x5yrs (stopped about 3wks ago). She has been experiencing increased urination but also urinary retention requiring catheter placement x1 in the ED. She followed up with urology 2022 with no other issues or considerations for uriary symptoms other than fibroid uterus (per patient). \par \par \par Obhx: sabx1, NSVDx3 \par Gynhx: Menses irregular for past 2yrs. No significantly heavy. Ovarian cyst (~8cm) on imaging in . Denies STDs.\par PSHX: B/l breast reconstructing , tonsillectomy , wisdom teeth\par Pmhx: IBS\par Meds: Denies\par Allg: NKA\par Shx: No T/E/D\par fanmhx: Colon cancer maternal grandfather

## 2023-07-25 ENCOUNTER — OUTPATIENT (OUTPATIENT)
Dept: OUTPATIENT SERVICES | Facility: HOSPITAL | Age: 53
LOS: 1 days | End: 2023-07-25
Payer: COMMERCIAL

## 2023-07-25 ENCOUNTER — APPOINTMENT (OUTPATIENT)
Dept: ULTRASOUND IMAGING | Facility: CLINIC | Age: 53
End: 2023-07-25
Payer: COMMERCIAL

## 2023-07-25 ENCOUNTER — RESULT REVIEW (OUTPATIENT)
Age: 53
End: 2023-07-25

## 2023-07-25 DIAGNOSIS — D25.1 INTRAMURAL LEIOMYOMA OF UTERUS: ICD-10-CM

## 2023-07-25 DIAGNOSIS — Z90.89 ACQUIRED ABSENCE OF OTHER ORGANS: Chronic | ICD-10-CM

## 2023-07-25 DIAGNOSIS — Z92.89 PERSONAL HISTORY OF OTHER MEDICAL TREATMENT: Chronic | ICD-10-CM

## 2023-07-25 PROCEDURE — 76830 TRANSVAGINAL US NON-OB: CPT

## 2023-07-25 PROCEDURE — 76830 TRANSVAGINAL US NON-OB: CPT | Mod: 26

## 2023-07-25 PROCEDURE — 76856 US EXAM PELVIC COMPLETE: CPT | Mod: 26

## 2023-07-25 PROCEDURE — 76856 US EXAM PELVIC COMPLETE: CPT

## 2023-09-07 ENCOUNTER — APPOINTMENT (OUTPATIENT)
Dept: OBGYN | Facility: CLINIC | Age: 53
End: 2023-09-07
Payer: COMMERCIAL

## 2023-09-07 VITALS
WEIGHT: 128 LBS | BODY MASS INDEX: 21.85 KG/M2 | HEIGHT: 64 IN | HEART RATE: 86 BPM | DIASTOLIC BLOOD PRESSURE: 85 MMHG | SYSTOLIC BLOOD PRESSURE: 137 MMHG

## 2023-09-07 PROCEDURE — 58100 BIOPSY OF UTERUS LINING: CPT

## 2023-09-07 NOTE — PROCEDURE
[Endometrial Biopsy] : Endometrial biopsy [Consent Obtained] : Consent obtained [Irregular Bleeding] : irregular uterine bleeding [Negative] : negative pregnancy test [LMPDate] : 8/22/23

## 2023-09-20 LAB — CORE LAB BIOPSY: NORMAL

## 2023-12-29 ENCOUNTER — OUTPATIENT (OUTPATIENT)
Dept: OUTPATIENT SERVICES | Facility: HOSPITAL | Age: 53
LOS: 1 days | End: 2023-12-29
Payer: COMMERCIAL

## 2023-12-29 VITALS
HEIGHT: 64 IN | OXYGEN SATURATION: 97 % | SYSTOLIC BLOOD PRESSURE: 108 MMHG | TEMPERATURE: 98 F | WEIGHT: 126.99 LBS | HEART RATE: 79 BPM | RESPIRATION RATE: 15 BRPM | DIASTOLIC BLOOD PRESSURE: 72 MMHG

## 2023-12-29 DIAGNOSIS — Z92.89 PERSONAL HISTORY OF OTHER MEDICAL TREATMENT: Chronic | ICD-10-CM

## 2023-12-29 DIAGNOSIS — Z01.818 ENCOUNTER FOR OTHER PREPROCEDURAL EXAMINATION: ICD-10-CM

## 2023-12-29 DIAGNOSIS — Z90.11 ACQUIRED ABSENCE OF RIGHT BREAST AND NIPPLE: Chronic | ICD-10-CM

## 2023-12-29 DIAGNOSIS — Z90.89 ACQUIRED ABSENCE OF OTHER ORGANS: Chronic | ICD-10-CM

## 2023-12-29 DIAGNOSIS — D25.1 INTRAMURAL LEIOMYOMA OF UTERUS: ICD-10-CM

## 2023-12-29 LAB
A1C WITH ESTIMATED AVERAGE GLUCOSE RESULT: 5.4 % — SIGNIFICANT CHANGE UP (ref 4–5.6)
A1C WITH ESTIMATED AVERAGE GLUCOSE RESULT: 5.4 % — SIGNIFICANT CHANGE UP (ref 4–5.6)
ANION GAP SERPL CALC-SCNC: 8 MMOL/L — SIGNIFICANT CHANGE UP (ref 5–17)
ANION GAP SERPL CALC-SCNC: 8 MMOL/L — SIGNIFICANT CHANGE UP (ref 5–17)
BLD GP AB SCN SERPL QL: NEGATIVE — SIGNIFICANT CHANGE UP
BLD GP AB SCN SERPL QL: NEGATIVE — SIGNIFICANT CHANGE UP
BUN SERPL-MCNC: 18 MG/DL — SIGNIFICANT CHANGE UP (ref 7–23)
BUN SERPL-MCNC: 18 MG/DL — SIGNIFICANT CHANGE UP (ref 7–23)
CALCIUM SERPL-MCNC: 8.9 MG/DL — SIGNIFICANT CHANGE UP (ref 8.4–10.5)
CALCIUM SERPL-MCNC: 8.9 MG/DL — SIGNIFICANT CHANGE UP (ref 8.4–10.5)
CHLORIDE SERPL-SCNC: 104 MMOL/L — SIGNIFICANT CHANGE UP (ref 96–108)
CHLORIDE SERPL-SCNC: 104 MMOL/L — SIGNIFICANT CHANGE UP (ref 96–108)
CO2 SERPL-SCNC: 26 MMOL/L — SIGNIFICANT CHANGE UP (ref 22–31)
CO2 SERPL-SCNC: 26 MMOL/L — SIGNIFICANT CHANGE UP (ref 22–31)
CREAT SERPL-MCNC: 0.87 MG/DL — SIGNIFICANT CHANGE UP (ref 0.5–1.3)
CREAT SERPL-MCNC: 0.87 MG/DL — SIGNIFICANT CHANGE UP (ref 0.5–1.3)
EGFR: 81 ML/MIN/1.73M2 — SIGNIFICANT CHANGE UP
EGFR: 81 ML/MIN/1.73M2 — SIGNIFICANT CHANGE UP
ESTIMATED AVERAGE GLUCOSE: 108 MG/DL — SIGNIFICANT CHANGE UP (ref 68–114)
ESTIMATED AVERAGE GLUCOSE: 108 MG/DL — SIGNIFICANT CHANGE UP (ref 68–114)
GLUCOSE SERPL-MCNC: 95 MG/DL — SIGNIFICANT CHANGE UP (ref 70–99)
GLUCOSE SERPL-MCNC: 95 MG/DL — SIGNIFICANT CHANGE UP (ref 70–99)
HCT VFR BLD CALC: 39.6 % — SIGNIFICANT CHANGE UP (ref 34.5–45)
HCT VFR BLD CALC: 39.6 % — SIGNIFICANT CHANGE UP (ref 34.5–45)
HGB BLD-MCNC: 12.8 G/DL — SIGNIFICANT CHANGE UP (ref 11.5–15.5)
HGB BLD-MCNC: 12.8 G/DL — SIGNIFICANT CHANGE UP (ref 11.5–15.5)
MCHC RBC-ENTMCNC: 29.5 PG — SIGNIFICANT CHANGE UP (ref 27–34)
MCHC RBC-ENTMCNC: 29.5 PG — SIGNIFICANT CHANGE UP (ref 27–34)
MCHC RBC-ENTMCNC: 32.3 GM/DL — SIGNIFICANT CHANGE UP (ref 32–36)
MCHC RBC-ENTMCNC: 32.3 GM/DL — SIGNIFICANT CHANGE UP (ref 32–36)
MCV RBC AUTO: 91.2 FL — SIGNIFICANT CHANGE UP (ref 80–100)
MCV RBC AUTO: 91.2 FL — SIGNIFICANT CHANGE UP (ref 80–100)
NRBC # BLD: 0 /100 WBCS — SIGNIFICANT CHANGE UP (ref 0–0)
NRBC # BLD: 0 /100 WBCS — SIGNIFICANT CHANGE UP (ref 0–0)
PLATELET # BLD AUTO: 288 K/UL — SIGNIFICANT CHANGE UP (ref 150–400)
PLATELET # BLD AUTO: 288 K/UL — SIGNIFICANT CHANGE UP (ref 150–400)
POTASSIUM SERPL-MCNC: 4.1 MMOL/L — SIGNIFICANT CHANGE UP (ref 3.5–5.3)
POTASSIUM SERPL-MCNC: 4.1 MMOL/L — SIGNIFICANT CHANGE UP (ref 3.5–5.3)
POTASSIUM SERPL-SCNC: 4.1 MMOL/L — SIGNIFICANT CHANGE UP (ref 3.5–5.3)
POTASSIUM SERPL-SCNC: 4.1 MMOL/L — SIGNIFICANT CHANGE UP (ref 3.5–5.3)
RBC # BLD: 4.34 M/UL — SIGNIFICANT CHANGE UP (ref 3.8–5.2)
RBC # BLD: 4.34 M/UL — SIGNIFICANT CHANGE UP (ref 3.8–5.2)
RBC # FLD: 12.9 % — SIGNIFICANT CHANGE UP (ref 10.3–14.5)
RBC # FLD: 12.9 % — SIGNIFICANT CHANGE UP (ref 10.3–14.5)
RH IG SCN BLD-IMP: POSITIVE — SIGNIFICANT CHANGE UP
RH IG SCN BLD-IMP: POSITIVE — SIGNIFICANT CHANGE UP
SODIUM SERPL-SCNC: 138 MMOL/L — SIGNIFICANT CHANGE UP (ref 135–145)
SODIUM SERPL-SCNC: 138 MMOL/L — SIGNIFICANT CHANGE UP (ref 135–145)
WBC # BLD: 4.35 K/UL — SIGNIFICANT CHANGE UP (ref 3.8–10.5)
WBC # BLD: 4.35 K/UL — SIGNIFICANT CHANGE UP (ref 3.8–10.5)
WBC # FLD AUTO: 4.35 K/UL — SIGNIFICANT CHANGE UP (ref 3.8–10.5)
WBC # FLD AUTO: 4.35 K/UL — SIGNIFICANT CHANGE UP (ref 3.8–10.5)

## 2023-12-29 PROCEDURE — 80048 BASIC METABOLIC PNL TOTAL CA: CPT

## 2023-12-29 PROCEDURE — 86901 BLOOD TYPING SEROLOGIC RH(D): CPT

## 2023-12-29 PROCEDURE — G0463: CPT

## 2023-12-29 PROCEDURE — 85027 COMPLETE CBC AUTOMATED: CPT

## 2023-12-29 PROCEDURE — 86850 RBC ANTIBODY SCREEN: CPT

## 2023-12-29 PROCEDURE — 83036 HEMOGLOBIN GLYCOSYLATED A1C: CPT

## 2023-12-29 PROCEDURE — 86900 BLOOD TYPING SEROLOGIC ABO: CPT

## 2023-12-29 NOTE — H&P PST ADULT - ASSESSMENT
DASI score: 8.9  DASI activity: swim a few times a week, able to walk up 6 flights of stairs   Loose teeth or denture: denies

## 2023-12-29 NOTE — H&P PST ADULT - NSANTHOSAYNRD_GEN_A_CORE
Patient currently off unit, will complete vitals and assessment when patient returns. No. LIZA screening performed.  STOP BANG Legend: 0-2 = LOW Risk; 3-4 = INTERMEDIATE Risk; 5-8 = HIGH Risk

## 2023-12-29 NOTE — H&P PST ADULT - NSICDXPASTSURGICALHX_GEN_ALL_CORE_FT
PAST SURGICAL HISTORY:  H/O right mastectomy     History of dental surgery     S/P tonsillectomy childhood

## 2023-12-29 NOTE — H&P PST ADULT - GASTROINTESTINAL COMMENTS
chronic abd bloating- gluten allergy?? IBS? chronic abd bloating- gluten allergy?? pt was seen by a GI- possible IBS

## 2023-12-29 NOTE — H&P PST ADULT - HISTORY OF PRESENT ILLNESS
54 y/o female. PMH right breast CA (s/p right mastectomy, on tamoxifen x 5 years), c/o metromenorrhagia, increased urinary urgency and frequency, evaluated by Dr. Keyes, diangosed with multiple fibroids, now presents to PST scheduled for total hystectomy, bilateral salpingectomy on 1/4.  52 y/o female . PMH right breast CA (s/p right mastectomy, on tamoxifen x 5 years), c/o metromenorrhagia, increased urinary urgency and frequency, evaluated by Dr. Keyes, diangosed with multiple fibroids, now presents to PST scheduled for total hystectomy, bilateral salpingectomy on .

## 2023-12-29 NOTE — H&P PST ADULT - MUSCULOSKELETAL
normal/ROM intact/no joint swelling/no joint erythema/no joint warmth/no calf tenderness normal/ROM intact/no joint swelling/no joint erythema/no joint warmth/no calf tenderness/normal gait negative

## 2024-01-03 ENCOUNTER — TRANSCRIPTION ENCOUNTER (OUTPATIENT)
Age: 54
End: 2024-01-03

## 2024-01-04 ENCOUNTER — OUTPATIENT (OUTPATIENT)
Dept: INPATIENT UNIT | Facility: HOSPITAL | Age: 54
LOS: 1 days | End: 2024-01-04
Payer: COMMERCIAL

## 2024-01-04 ENCOUNTER — RESULT REVIEW (OUTPATIENT)
Age: 54
End: 2024-01-04

## 2024-01-04 ENCOUNTER — TRANSCRIPTION ENCOUNTER (OUTPATIENT)
Age: 54
End: 2024-01-04

## 2024-01-04 ENCOUNTER — APPOINTMENT (OUTPATIENT)
Dept: OBGYN | Facility: HOSPITAL | Age: 54
End: 2024-01-04

## 2024-01-04 VITALS
SYSTOLIC BLOOD PRESSURE: 96 MMHG | TEMPERATURE: 98 F | HEART RATE: 78 BPM | OXYGEN SATURATION: 96 % | WEIGHT: 126.99 LBS | RESPIRATION RATE: 16 BRPM | HEIGHT: 64 IN | DIASTOLIC BLOOD PRESSURE: 66 MMHG

## 2024-01-04 DIAGNOSIS — Z90.11 ACQUIRED ABSENCE OF RIGHT BREAST AND NIPPLE: Chronic | ICD-10-CM

## 2024-01-04 DIAGNOSIS — D25.1 INTRAMURAL LEIOMYOMA OF UTERUS: ICD-10-CM

## 2024-01-04 DIAGNOSIS — Z90.89 ACQUIRED ABSENCE OF OTHER ORGANS: Chronic | ICD-10-CM

## 2024-01-04 DIAGNOSIS — Z92.89 PERSONAL HISTORY OF OTHER MEDICAL TREATMENT: Chronic | ICD-10-CM

## 2024-01-04 LAB
GLUCOSE BLDC GLUCOMTR-MCNC: 79 MG/DL — SIGNIFICANT CHANGE UP (ref 70–99)
GLUCOSE BLDC GLUCOMTR-MCNC: 79 MG/DL — SIGNIFICANT CHANGE UP (ref 70–99)
HCG UR QL: NEGATIVE — SIGNIFICANT CHANGE UP
HCG UR QL: NEGATIVE — SIGNIFICANT CHANGE UP

## 2024-01-04 PROCEDURE — 58571 TLH W/T/O 250 G OR LESS: CPT

## 2024-01-04 PROCEDURE — 88307 TISSUE EXAM BY PATHOLOGIST: CPT | Mod: 26

## 2024-01-04 DEVICE — SURGICEL POWDER 3 GRAMS
Type: IMPLANTABLE DEVICE | Site: BILATERAL | Status: NON-FUNCTIONAL
Removed: 2024-01-04

## 2024-01-04 RX ORDER — CELECOXIB 200 MG/1
400 CAPSULE ORAL ONCE
Refills: 0 | Status: COMPLETED | OUTPATIENT
Start: 2024-01-04 | End: 2024-01-04

## 2024-01-04 RX ORDER — FENTANYL CITRATE 50 UG/ML
25 INJECTION INTRAVENOUS
Refills: 0 | Status: DISCONTINUED | OUTPATIENT
Start: 2024-01-04 | End: 2024-01-05

## 2024-01-04 RX ORDER — SODIUM CHLORIDE 9 MG/ML
1000 INJECTION, SOLUTION INTRAVENOUS
Refills: 0 | Status: DISCONTINUED | OUTPATIENT
Start: 2024-01-04 | End: 2024-01-18

## 2024-01-04 RX ORDER — OXYCODONE HYDROCHLORIDE 5 MG/1
1 TABLET ORAL
Qty: 5 | Refills: 0
Start: 2024-01-04

## 2024-01-04 RX ORDER — SODIUM CHLORIDE 9 MG/ML
3 INJECTION INTRAMUSCULAR; INTRAVENOUS; SUBCUTANEOUS EVERY 8 HOURS
Refills: 0 | Status: DISCONTINUED | OUTPATIENT
Start: 2024-01-04 | End: 2024-01-04

## 2024-01-04 RX ORDER — IBUPROFEN 200 MG
3 TABLET ORAL
Qty: 0 | Refills: 0 | DISCHARGE

## 2024-01-04 RX ORDER — CEFOTETAN DISODIUM 1 G
2 VIAL (EA) INJECTION ONCE
Refills: 0 | Status: COMPLETED | OUTPATIENT
Start: 2024-01-04 | End: 2024-01-04

## 2024-01-04 RX ORDER — ONDANSETRON 8 MG/1
4 TABLET, FILM COATED ORAL ONCE
Refills: 0 | Status: DISCONTINUED | OUTPATIENT
Start: 2024-01-04 | End: 2024-01-05

## 2024-01-04 RX ORDER — LIDOCAINE HCL 20 MG/ML
0.2 VIAL (ML) INJECTION ONCE
Refills: 0 | Status: DISCONTINUED | OUTPATIENT
Start: 2024-01-04 | End: 2024-01-04

## 2024-01-04 RX ORDER — ACETAMINOPHEN 500 MG
3 TABLET ORAL
Qty: 0 | Refills: 0 | DISCHARGE

## 2024-01-04 RX ORDER — ACETAMINOPHEN 500 MG
1000 TABLET ORAL ONCE
Refills: 0 | Status: COMPLETED | OUTPATIENT
Start: 2024-01-04 | End: 2024-01-04

## 2024-01-04 RX ORDER — GABAPENTIN 400 MG/1
600 CAPSULE ORAL ONCE
Refills: 0 | Status: COMPLETED | OUTPATIENT
Start: 2024-01-04 | End: 2024-01-04

## 2024-01-04 RX ORDER — CHLORHEXIDINE GLUCONATE 213 G/1000ML
1 SOLUTION TOPICAL ONCE
Refills: 0 | Status: COMPLETED | OUTPATIENT
Start: 2024-01-04 | End: 2024-01-04

## 2024-01-04 RX ORDER — SIMETHICONE 80 MG/1
80 TABLET, CHEWABLE ORAL ONCE
Refills: 0 | Status: COMPLETED | OUTPATIENT
Start: 2024-01-04 | End: 2024-01-04

## 2024-01-04 RX ADMIN — SODIUM CHLORIDE 3 MILLILITER(S): 9 INJECTION INTRAMUSCULAR; INTRAVENOUS; SUBCUTANEOUS at 11:03

## 2024-01-04 RX ADMIN — GABAPENTIN 600 MILLIGRAM(S): 400 CAPSULE ORAL at 11:32

## 2024-01-04 RX ADMIN — FENTANYL CITRATE 25 MICROGRAM(S): 50 INJECTION INTRAVENOUS at 16:27

## 2024-01-04 RX ADMIN — Medication 1000 MILLIGRAM(S): at 11:32

## 2024-01-04 RX ADMIN — SIMETHICONE 80 MILLIGRAM(S): 80 TABLET, CHEWABLE ORAL at 20:00

## 2024-01-04 RX ADMIN — CHLORHEXIDINE GLUCONATE 1 APPLICATION(S): 213 SOLUTION TOPICAL at 11:33

## 2024-01-04 RX ADMIN — FENTANYL CITRATE 25 MICROGRAM(S): 50 INJECTION INTRAVENOUS at 16:45

## 2024-01-04 NOTE — BRIEF OPERATIVE NOTE - NSICDXBRIEFPROCEDURE_GEN_ALL_CORE_FT
PROCEDURES:  Laparoscopic total hysterectomy with bilateral salpingectomy and cystoscopy 04-Jan-2024 15:52:47  Jereen, Amyeo

## 2024-01-04 NOTE — BRIEF OPERATIVE NOTE - NSICDXBRIEFPOSTOP_GEN_ALL_CORE_FT
POST-OP DIAGNOSIS:  Abnormal uterine bleeding due to leiomyoma of uterus 04-Jan-2024 15:53:40  Jereen, Amyeo

## 2024-01-04 NOTE — ASU PATIENT PROFILE, ADULT - FALL HARM RISK - UNIVERSAL INTERVENTIONS
Bed in lowest position, wheels locked, appropriate side rails in place/Call bell, personal items and telephone in reach/Instruct patient to call for assistance before getting out of bed or chair/Non-slip footwear when patient is out of bed/[place] to call system/Physically safe environment - no spills, clutter or unnecessary equipment/Purposeful Proactive Rounding/Room/bathroom lighting operational, light cord in reach Bed in lowest position, wheels locked, appropriate side rails in place/Call bell, personal items and telephone in reach/Instruct patient to call for assistance before getting out of bed or chair/Non-slip footwear when patient is out of bed/[place] to call system/Physically safe environment - no spills, clutter or unnecessary equipment/Purposeful Proactive Rounding/Room/bathroom lighting operational, light cord in reach

## 2024-01-04 NOTE — ASU DISCHARGE PLAN (ADULT/PEDIATRIC) - CARE PROVIDER_API CALL
Andrey Keyes  Obstetrics and Gynecology  [address] [place] [address], [address]  [place] [address]  Phone: [phone]  Fax: [phone]  Follow Up Time: 2 weeks   Andrey Keyes  Obstetrics and Gynecology  [address] [place] [address], [address]  [place] [address]  Phone: [phone]  Fax: [phone]  Follow Up Time: 2 weeks

## 2024-01-04 NOTE — CHART NOTE - NSCHARTNOTEFT_GEN_A_CORE
R1 Post-Op Check    Patient seen and examined at bedside, recently post-op. No acute complaints at this time. Patient states that she still feels groggy from the surgery, and has not yet tried PO. She has not yet voided. Denies CP, SOB, N/V, fevers, and chills.    Vital Signs Last 24 Hours  T(C): 36.2 (01-04-24 @ 15:12), Max: 36.5 (01-04-24 @ 10:07)  HR: 59 (01-04-24 @ 17:15) (54 - 78)  BP: 107/65 (01-04-24 @ 17:15) (96/66 - 114/66)  RR: 16 (01-04-24 @ 17:15) (16 - 16)  SpO2: 97% (01-04-24 @ 17:15) (96% - 100%)    I&O's Summary      Physical Exam:  General: NAD  CV: RRR  Lungs: CTA-B  Abdomen: Soft, appropriately tender, non-distended, +BS  Incision: laparoscopic incisions CDI  Ext: No pain or swelling    Labs:      MEDICATIONS  (STANDING):  cefoTEtan  IVPB 2 Gram(s) IV Intermittent once  lactated ringers. 1000 milliLiter(s) (125 mL/Hr) IV Continuous <Continuous>    MEDICATIONS  (PRN):  fentaNYL    Injectable 25 MICROGram(s) IV Push every 5 minutes PRN Moderate Pain (4 - 6)  ondansetron Injectable 4 milliGRAM(s) IV Push once PRN Nausea and/or Vomiting      54 yo s/p TLH/BS recovering well in acute post-operative state.    Neuro: Pain controlled. PO pain meds   CV: Hemodynamically stable  Pulm: Encourage oob/ambulation, incentive spirometer at bedside  GI: Advance diet as tolerated  : DTV@11p  Heme: SCDs for DVT PPX  ID: afebrile  Endo: no active issues  Dispo: continue routine post-op care    Melissa Fuchs PGY1  d/w GYN team and Dr. Garcia R1 Post-Op Check    Patient seen and examined at bedside, recently post-op. No acute complaints at this time. Patient states that she still feels groggy from the surgery, and has not yet tried PO. She has not yet voided. Denies CP, SOB, N/V, fevers, and chills.    Vital Signs Last 24 Hours  T(C): 36.2 (01-04-24 @ 15:12), Max: 36.5 (01-04-24 @ 10:07)  HR: 59 (01-04-24 @ 17:15) (54 - 78)  BP: 107/65 (01-04-24 @ 17:15) (96/66 - 114/66)  RR: 16 (01-04-24 @ 17:15) (16 - 16)  SpO2: 97% (01-04-24 @ 17:15) (96% - 100%)    I&O's Summary      Physical Exam:  General: NAD  CV: RRR  Lungs: CTA-B  Abdomen: Soft, appropriately tender, non-distended, +BS  Incision: laparoscopic incisions CDI  Ext: No pain or swelling    Labs:      MEDICATIONS  (STANDING):  cefoTEtan  IVPB 2 Gram(s) IV Intermittent once  lactated ringers. 1000 milliLiter(s) (125 mL/Hr) IV Continuous <Continuous>    MEDICATIONS  (PRN):  fentaNYL    Injectable 25 MICROGram(s) IV Push every 5 minutes PRN Moderate Pain (4 - 6)  ondansetron Injectable 4 milliGRAM(s) IV Push once PRN Nausea and/or Vomiting      52 yo s/p TLH/BS recovering well in acute post-operative state.    Neuro: Pain controlled. PO pain meds   CV: Hemodynamically stable  Pulm: Encourage oob/ambulation, incentive spirometer at bedside  GI: Advance diet as tolerated  : DTV@11p  Heme: SCDs for DVT PPX  ID: afebrile  Endo: no active issues  Dispo: continue routine post-op care    Melissa Fuchs PGY1  d/w GYN team and Dr. Garcia

## 2024-01-04 NOTE — CHART NOTE - NSCHARTNOTEFT_GEN_A_CORE
Patient evaluated at bedside due to call from RN that patient with increasing discomfort and inability to urinate.  Per RN, patient has ambulated to restroom to attempt to void x4, but unable to void with increasing discomfort.  Bladder scan by RN showing ~280cc urine.  On my arrival to PACU, patient resting comfortably in chair.  States that she is 4/10 uncomfortable, and that this is improved from earlier.  Reports that she feels some lower abdominal pressure that she associates with needing to void, but feels that she "can't yet control her bladder muscles to urinate".  States that she is nervous because she previously presented to the ED with an inability to void and needed a coleman catheter - so she is afraid of getting "too full" and needing a catheter again.  Patient states she has been PO hydrating.  Abdomen soft, nontender, no suprapubic pain.    - Encouraged patient to continue PO hydrating and attempt again to void, patient DTV by 11pm  - If patient continues to be unable to void with increasing discomfort, can straight catheterize    discussed with DAMASO Howell Fellow  NIKITA Harvey PGY2

## 2024-01-04 NOTE — BRIEF OPERATIVE NOTE - NSICDXBRIEFPREOP_GEN_ALL_CORE_FT
PRE-OP DIAGNOSIS:  Abnormal uterine bleeding due to leiomyoma of uterus 04-Jan-2024 15:53:34  Jereen, Amyeo

## 2024-01-04 NOTE — ASU DISCHARGE PLAN (ADULT/PEDIATRIC) - NS MD DC FALL RISK RISK
For information on Fall & Injury Prevention, visit: [email or web address] OR  [email or web address] OR  https://www.cdc.gov/steadi/patient.html For information on Fall & Injury Prevention, visit: [email or web address] OR  [email or web address] OR  https://www.cdc.gov/steadi/patient.html

## 2024-01-04 NOTE — BRIEF OPERATIVE NOTE - OPERATION/FINDINGS
EUA reveals anteverted mobile uterus, 16wks in size. External genitalia wnl.  LSC minimal adhesive disease, enlarged uterus with multiple intramural fibroids, bilateral fallopian tubes and ovaries wnl.  Bilateral ureters visualized vermiculating along the pelvic brim down to the lower pelvis.

## 2024-01-04 NOTE — ASU DISCHARGE PLAN (ADULT/PEDIATRIC) - FOLLOW UP APPOINTMENTS
[facility], [facility], [facility]

## 2024-01-04 NOTE — ASU DISCHARGE PLAN (ADULT/PEDIATRIC) - ASU DC SPECIAL INSTRUCTIONSFT
Postoperative Instructions      Pain control     For pain control, take the followin. Motrin 600mg four times a day, take with food  2. Add Tylenol 975 four times a day, alternated with Motrin  3. Add oxycodone as needed for severe pain not managed well by Motrin and Tylenol. A prescription has been sent to your pharmacy on file.     Motrin and Tylenol can be obtained over the counter.    Postoperative restrictions   Do not drive or make important decisions for 24 hours after anesthesia. You may feel drowsy for 24 hours and should have a responsible adult with you during that time. Nothing in the vagina (tampons, sexual intercourse), No tub baths, pools or hot tubs for 8 weeks (showers are ok!). No lifting anything heavier than 15 lbs, no strenuous exercise for 8 weeks after surgery. Do not pull or cut any stitches that you see around your incision.         Vaginal bleeding   Spotting and intermittent passage of blood clots per vagina is normal in first few weeks after surgery. If you are soaking 1 pad per hour, that is not normal and you should notify Dr. Keyes's office and seek medical attention right away.      Vaginal discharge   Vaginal discharge (all colors) is normal after vaginal surgery. If you’ve had vaginal surgery, you have sutures in your vagina which take 3 months to fully absorb. You may have vaginal discharge during this time. This is normal.      Signs of Infection  Some postoperative pain and discomfort is normal. However, if you experience any of the following, you may be developing an infection and should be seen by your doctor: pain that does not get better with the oral medications listed above, fever greater than 100.4F, foul smelling discharge coming from the surgical site, nausea and vomiting that does not stop (especially if you are unable to tolerate oral intake), or inability to urinate. If you experience any of these symptoms, call your doctor's office to be seen right away.    Follow Up  Call Dr. Keyes's office to schedule a postoperative appointment in 2 weeks. The results from the procedure will be discussed with you at that time.

## 2024-01-04 NOTE — ASU DISCHARGE PLAN (ADULT/PEDIATRIC) - PROVIDER TOKENS
PROVIDER:[TOKEN:[[id number]],FOLLOWUP:[2 weeks]] PROVIDER:[TOKEN:[[id number]],FOLLOWUP:[2 weeks]]

## 2024-01-05 VITALS
SYSTOLIC BLOOD PRESSURE: 119 MMHG | HEART RATE: 78 BPM | DIASTOLIC BLOOD PRESSURE: 69 MMHG | OXYGEN SATURATION: 98 % | TEMPERATURE: 98 F | RESPIRATION RATE: 18 BRPM

## 2024-01-05 PROCEDURE — C1889: CPT

## 2024-01-05 PROCEDURE — 88307 TISSUE EXAM BY PATHOLOGIST: CPT

## 2024-01-05 PROCEDURE — 82962 GLUCOSE BLOOD TEST: CPT

## 2024-01-05 PROCEDURE — 81025 URINE PREGNANCY TEST: CPT

## 2024-01-05 PROCEDURE — 58573 TLH W/T/O UTERUS OVER 250 G: CPT

## 2024-01-05 PROCEDURE — C9399: CPT

## 2024-01-05 RX ORDER — SIMETHICONE 80 MG/1
80 TABLET, CHEWABLE ORAL ONCE
Refills: 0 | Status: COMPLETED | OUTPATIENT
Start: 2024-01-05 | End: 2024-01-05

## 2024-01-05 RX ADMIN — SIMETHICONE 80 MILLIGRAM(S): 80 TABLET, CHEWABLE ORAL at 02:00

## 2024-01-11 LAB
SURGICAL PATHOLOGY STUDY: SIGNIFICANT CHANGE UP
SURGICAL PATHOLOGY STUDY: SIGNIFICANT CHANGE UP

## 2024-01-12 ENCOUNTER — LABORATORY RESULT (OUTPATIENT)
Age: 54
End: 2024-01-12

## 2024-01-12 ENCOUNTER — APPOINTMENT (OUTPATIENT)
Dept: OBGYN | Facility: CLINIC | Age: 54
End: 2024-01-12
Payer: COMMERCIAL

## 2024-01-12 ENCOUNTER — NON-APPOINTMENT (OUTPATIENT)
Age: 54
End: 2024-01-12

## 2024-01-12 ENCOUNTER — APPOINTMENT (OUTPATIENT)
Dept: ULTRASOUND IMAGING | Facility: CLINIC | Age: 54
End: 2024-01-12
Payer: COMMERCIAL

## 2024-01-12 VITALS
SYSTOLIC BLOOD PRESSURE: 98 MMHG | DIASTOLIC BLOOD PRESSURE: 63 MMHG | BODY MASS INDEX: 21.68 KG/M2 | HEIGHT: 64 IN | WEIGHT: 127 LBS

## 2024-01-12 DIAGNOSIS — Z90.710 ACQUIRED ABSENCE OF BOTH CERVIX AND UTERUS: ICD-10-CM

## 2024-01-12 DIAGNOSIS — R10.2 PELVIC AND PERINEAL PAIN: ICD-10-CM

## 2024-01-12 PROCEDURE — 36415 COLL VENOUS BLD VENIPUNCTURE: CPT

## 2024-01-12 PROCEDURE — 76856 US EXAM PELVIC COMPLETE: CPT

## 2024-01-12 PROCEDURE — 99024 POSTOP FOLLOW-UP VISIT: CPT

## 2024-01-12 PROCEDURE — 76830 TRANSVAGINAL US NON-OB: CPT

## 2024-01-12 NOTE — PLAN
[de-identified] :    - rx for pelvic sono given - Pt to follow up at ER for respiratory label  [FreeTextEntry1] : 54 YO s/p TLH w fever -UCx and  cbc done  -pt to have TVUS at [facility] today -pt to have RVP at Urgent Care -precautions reviewed -if spikes another fever to ED -reviewed with Dr. Keyes

## 2024-01-12 NOTE — HISTORY OF PRESENT ILLNESS
[de-identified] : MORGAN [de-identified] : 53 year old s/p TLH with fever that developed last night at 101.2 and went as high as 101.7. She took Tylenol and the fever went down. This morning with her fever at 100.2. Her incision sites have been normal, no vaginal discharge or blood in urine or pain during urination noted. She is passing gas and having bowel movements and reports crampy abdominal pain and some decreased appetite with no nausea or vomiting.  Abd: soft, no guarding or rebound, mildly tender, incision sites clean, dry intact VE: no focal masses or tenderness, vaginal cuff visualized and intact, intact on palpation [de-identified] : mild rebound, no focal tenderness

## 2024-01-12 NOTE — END OF VISIT
[FreeTextEntry3] : I, Dago Hayes, acted as a scribe on behalf of Dr. Love Ortega on 01/12/2024 .  All medical entries made by the scribe were at my, Dr. Love Ortega, direction and personally dictated by me on 01/12/2024. I have reviewed the chart and agree that the record accurately reflects my personal performance of the history, physical exam, assessment and plan. I have also personally directed, reviewed, and agreed with the chart.

## 2024-01-13 ENCOUNTER — EMERGENCY (EMERGENCY)
Facility: HOSPITAL | Age: 54
LOS: 1 days | Discharge: HOME | End: 2024-01-13
Attending: STUDENT IN AN ORGANIZED HEALTH CARE EDUCATION/TRAINING PROGRAM
Payer: COMMERCIAL

## 2024-01-13 VITALS
SYSTOLIC BLOOD PRESSURE: 111 MMHG | TEMPERATURE: 100 F | HEART RATE: 96 BPM | WEIGHT: 126.1 LBS | DIASTOLIC BLOOD PRESSURE: 77 MMHG | HEIGHT: 64 IN | RESPIRATION RATE: 18 BRPM | OXYGEN SATURATION: 98 %

## 2024-01-13 VITALS
TEMPERATURE: 98 F | OXYGEN SATURATION: 97 % | RESPIRATION RATE: 18 BRPM | HEART RATE: 90 BPM | SYSTOLIC BLOOD PRESSURE: 101 MMHG | DIASTOLIC BLOOD PRESSURE: 68 MMHG

## 2024-01-13 DIAGNOSIS — Z90.710 ACQUIRED ABSENCE OF BOTH CERVIX AND UTERUS: Chronic | ICD-10-CM

## 2024-01-13 DIAGNOSIS — Z90.11 ACQUIRED ABSENCE OF RIGHT BREAST AND NIPPLE: Chronic | ICD-10-CM

## 2024-01-13 DIAGNOSIS — Z92.89 PERSONAL HISTORY OF OTHER MEDICAL TREATMENT: Chronic | ICD-10-CM

## 2024-01-13 DIAGNOSIS — Z90.89 ACQUIRED ABSENCE OF OTHER ORGANS: Chronic | ICD-10-CM

## 2024-01-13 LAB
ALBUMIN SERPL ELPH-MCNC: 3.7 G/DL — SIGNIFICANT CHANGE UP (ref 3.3–5)
ALBUMIN SERPL ELPH-MCNC: 3.7 G/DL — SIGNIFICANT CHANGE UP (ref 3.3–5)
ALP SERPL-CCNC: 139 U/L — HIGH (ref 40–120)
ALP SERPL-CCNC: 139 U/L — HIGH (ref 40–120)
ALT FLD-CCNC: 75 U/L — HIGH (ref 10–45)
ALT FLD-CCNC: 75 U/L — HIGH (ref 10–45)
ANION GAP SERPL CALC-SCNC: 9 MMOL/L — SIGNIFICANT CHANGE UP (ref 5–17)
ANION GAP SERPL CALC-SCNC: 9 MMOL/L — SIGNIFICANT CHANGE UP (ref 5–17)
APPEARANCE UR: CLEAR — SIGNIFICANT CHANGE UP
AST SERPL-CCNC: 37 U/L — SIGNIFICANT CHANGE UP (ref 10–40)
AST SERPL-CCNC: 37 U/L — SIGNIFICANT CHANGE UP (ref 10–40)
BACTERIA # UR AUTO: ABNORMAL /HPF
BACTERIA # UR AUTO: ABNORMAL /HPF
BACTERIA # UR AUTO: NEGATIVE /HPF — SIGNIFICANT CHANGE UP
BACTERIA # UR AUTO: NEGATIVE /HPF — SIGNIFICANT CHANGE UP
BASE EXCESS BLDV CALC-SCNC: 1.1 MMOL/L — SIGNIFICANT CHANGE UP (ref -2–3)
BASE EXCESS BLDV CALC-SCNC: 1.1 MMOL/L — SIGNIFICANT CHANGE UP (ref -2–3)
BASOPHILS # BLD AUTO: 0.03 K/UL — SIGNIFICANT CHANGE UP (ref 0–0.2)
BASOPHILS # BLD AUTO: 0.03 K/UL — SIGNIFICANT CHANGE UP (ref 0–0.2)
BASOPHILS NFR BLD AUTO: 0.3 % — SIGNIFICANT CHANGE UP (ref 0–2)
BASOPHILS NFR BLD AUTO: 0.3 % — SIGNIFICANT CHANGE UP (ref 0–2)
BILIRUB SERPL-MCNC: 0.2 MG/DL — SIGNIFICANT CHANGE UP (ref 0.2–1.2)
BILIRUB SERPL-MCNC: 0.2 MG/DL — SIGNIFICANT CHANGE UP (ref 0.2–1.2)
BILIRUB UR-MCNC: NEGATIVE — SIGNIFICANT CHANGE UP
BUN SERPL-MCNC: 11 MG/DL — SIGNIFICANT CHANGE UP (ref 7–23)
BUN SERPL-MCNC: 11 MG/DL — SIGNIFICANT CHANGE UP (ref 7–23)
CA-I SERPL-SCNC: 1.23 MMOL/L — SIGNIFICANT CHANGE UP (ref 1.15–1.33)
CA-I SERPL-SCNC: 1.23 MMOL/L — SIGNIFICANT CHANGE UP (ref 1.15–1.33)
CALCIUM SERPL-MCNC: 9 MG/DL — SIGNIFICANT CHANGE UP (ref 8.4–10.5)
CALCIUM SERPL-MCNC: 9 MG/DL — SIGNIFICANT CHANGE UP (ref 8.4–10.5)
CAST: 0 /LPF — SIGNIFICANT CHANGE UP (ref 0–4)
CHLORIDE BLDV-SCNC: 104 MMOL/L — SIGNIFICANT CHANGE UP (ref 96–108)
CHLORIDE BLDV-SCNC: 104 MMOL/L — SIGNIFICANT CHANGE UP (ref 96–108)
CHLORIDE SERPL-SCNC: 104 MMOL/L — SIGNIFICANT CHANGE UP (ref 96–108)
CHLORIDE SERPL-SCNC: 104 MMOL/L — SIGNIFICANT CHANGE UP (ref 96–108)
CO2 BLDV-SCNC: 28 MMOL/L — HIGH (ref 22–26)
CO2 BLDV-SCNC: 28 MMOL/L — HIGH (ref 22–26)
CO2 SERPL-SCNC: 25 MMOL/L — SIGNIFICANT CHANGE UP (ref 22–31)
CO2 SERPL-SCNC: 25 MMOL/L — SIGNIFICANT CHANGE UP (ref 22–31)
COLOR SPEC: YELLOW — SIGNIFICANT CHANGE UP
CREAT SERPL-MCNC: 0.73 MG/DL — SIGNIFICANT CHANGE UP (ref 0.5–1.3)
CREAT SERPL-MCNC: 0.73 MG/DL — SIGNIFICANT CHANGE UP (ref 0.5–1.3)
DIFF PNL FLD: ABNORMAL
EGFR: 100 ML/MIN/1.73M2 — SIGNIFICANT CHANGE UP
EGFR: 100 ML/MIN/1.73M2 — SIGNIFICANT CHANGE UP
EOSINOPHIL # BLD AUTO: 0.32 K/UL — SIGNIFICANT CHANGE UP (ref 0–0.5)
EOSINOPHIL # BLD AUTO: 0.32 K/UL — SIGNIFICANT CHANGE UP (ref 0–0.5)
EOSINOPHIL NFR BLD AUTO: 2.8 % — SIGNIFICANT CHANGE UP (ref 0–6)
EOSINOPHIL NFR BLD AUTO: 2.8 % — SIGNIFICANT CHANGE UP (ref 0–6)
FLUAV AG NPH QL: SIGNIFICANT CHANGE UP
FLUAV AG NPH QL: SIGNIFICANT CHANGE UP
FLUBV AG NPH QL: SIGNIFICANT CHANGE UP
FLUBV AG NPH QL: SIGNIFICANT CHANGE UP
GAS PNL BLDV: 135 MMOL/L — LOW (ref 136–145)
GAS PNL BLDV: 135 MMOL/L — LOW (ref 136–145)
GAS PNL BLDV: SIGNIFICANT CHANGE UP
GLUCOSE BLDV-MCNC: 84 MG/DL — SIGNIFICANT CHANGE UP (ref 70–99)
GLUCOSE BLDV-MCNC: 84 MG/DL — SIGNIFICANT CHANGE UP (ref 70–99)
GLUCOSE SERPL-MCNC: 87 MG/DL — SIGNIFICANT CHANGE UP (ref 70–99)
GLUCOSE SERPL-MCNC: 87 MG/DL — SIGNIFICANT CHANGE UP (ref 70–99)
GLUCOSE UR QL: NEGATIVE MG/DL — SIGNIFICANT CHANGE UP
HCO3 BLDV-SCNC: 27 MMOL/L — SIGNIFICANT CHANGE UP (ref 22–29)
HCO3 BLDV-SCNC: 27 MMOL/L — SIGNIFICANT CHANGE UP (ref 22–29)
HCT VFR BLD CALC: 33.6 % — LOW (ref 34.5–45)
HCT VFR BLD CALC: 33.6 % — LOW (ref 34.5–45)
HCT VFR BLDA CALC: 34 % — LOW (ref 34.5–46.5)
HCT VFR BLDA CALC: 34 % — LOW (ref 34.5–46.5)
HGB BLD CALC-MCNC: 11.4 G/DL — LOW (ref 11.7–16.1)
HGB BLD CALC-MCNC: 11.4 G/DL — LOW (ref 11.7–16.1)
HGB BLD-MCNC: 11 G/DL — LOW (ref 11.5–15.5)
HGB BLD-MCNC: 11 G/DL — LOW (ref 11.5–15.5)
IMM GRANULOCYTES NFR BLD AUTO: 0.5 % — SIGNIFICANT CHANGE UP (ref 0–0.9)
IMM GRANULOCYTES NFR BLD AUTO: 0.5 % — SIGNIFICANT CHANGE UP (ref 0–0.9)
KETONES UR-MCNC: 15 MG/DL
KETONES UR-MCNC: 15 MG/DL
KETONES UR-MCNC: ABNORMAL MG/DL
KETONES UR-MCNC: ABNORMAL MG/DL
LACTATE BLDV-MCNC: 0.9 MMOL/L — SIGNIFICANT CHANGE UP (ref 0.5–2)
LACTATE BLDV-MCNC: 0.9 MMOL/L — SIGNIFICANT CHANGE UP (ref 0.5–2)
LEUKOCYTE ESTERASE UR-ACNC: ABNORMAL
LYMPHOCYTES # BLD AUTO: 1.09 K/UL — SIGNIFICANT CHANGE UP (ref 1–3.3)
LYMPHOCYTES # BLD AUTO: 1.09 K/UL — SIGNIFICANT CHANGE UP (ref 1–3.3)
LYMPHOCYTES # BLD AUTO: 9.7 % — LOW (ref 13–44)
LYMPHOCYTES # BLD AUTO: 9.7 % — LOW (ref 13–44)
MCHC RBC-ENTMCNC: 29.5 PG — SIGNIFICANT CHANGE UP (ref 27–34)
MCHC RBC-ENTMCNC: 29.5 PG — SIGNIFICANT CHANGE UP (ref 27–34)
MCHC RBC-ENTMCNC: 32.7 GM/DL — SIGNIFICANT CHANGE UP (ref 32–36)
MCHC RBC-ENTMCNC: 32.7 GM/DL — SIGNIFICANT CHANGE UP (ref 32–36)
MCV RBC AUTO: 90.1 FL — SIGNIFICANT CHANGE UP (ref 80–100)
MCV RBC AUTO: 90.1 FL — SIGNIFICANT CHANGE UP (ref 80–100)
MONOCYTES # BLD AUTO: 1.48 K/UL — HIGH (ref 0–0.9)
MONOCYTES # BLD AUTO: 1.48 K/UL — HIGH (ref 0–0.9)
MONOCYTES NFR BLD AUTO: 13.1 % — SIGNIFICANT CHANGE UP (ref 2–14)
MONOCYTES NFR BLD AUTO: 13.1 % — SIGNIFICANT CHANGE UP (ref 2–14)
NEUTROPHILS # BLD AUTO: 8.29 K/UL — HIGH (ref 1.8–7.4)
NEUTROPHILS # BLD AUTO: 8.29 K/UL — HIGH (ref 1.8–7.4)
NEUTROPHILS NFR BLD AUTO: 73.6 % — SIGNIFICANT CHANGE UP (ref 43–77)
NEUTROPHILS NFR BLD AUTO: 73.6 % — SIGNIFICANT CHANGE UP (ref 43–77)
NITRITE UR-MCNC: NEGATIVE — SIGNIFICANT CHANGE UP
NRBC # BLD: 0 /100 WBCS — SIGNIFICANT CHANGE UP (ref 0–0)
NRBC # BLD: 0 /100 WBCS — SIGNIFICANT CHANGE UP (ref 0–0)
PCO2 BLDV: 45 MMHG — HIGH (ref 39–42)
PCO2 BLDV: 45 MMHG — HIGH (ref 39–42)
PH BLDV: 7.38 — SIGNIFICANT CHANGE UP (ref 7.32–7.43)
PH BLDV: 7.38 — SIGNIFICANT CHANGE UP (ref 7.32–7.43)
PH UR: 6 — SIGNIFICANT CHANGE UP (ref 5–8)
PH UR: 6 — SIGNIFICANT CHANGE UP (ref 5–8)
PH UR: 6.5 — SIGNIFICANT CHANGE UP (ref 5–8)
PH UR: 6.5 — SIGNIFICANT CHANGE UP (ref 5–8)
PLATELET # BLD AUTO: 315 K/UL — SIGNIFICANT CHANGE UP (ref 150–400)
PLATELET # BLD AUTO: 315 K/UL — SIGNIFICANT CHANGE UP (ref 150–400)
PO2 BLDV: 43 MMHG — SIGNIFICANT CHANGE UP (ref 25–45)
PO2 BLDV: 43 MMHG — SIGNIFICANT CHANGE UP (ref 25–45)
POTASSIUM BLDV-SCNC: 3.8 MMOL/L — SIGNIFICANT CHANGE UP (ref 3.5–5.1)
POTASSIUM BLDV-SCNC: 3.8 MMOL/L — SIGNIFICANT CHANGE UP (ref 3.5–5.1)
POTASSIUM SERPL-MCNC: 3.9 MMOL/L — SIGNIFICANT CHANGE UP (ref 3.5–5.3)
POTASSIUM SERPL-MCNC: 3.9 MMOL/L — SIGNIFICANT CHANGE UP (ref 3.5–5.3)
POTASSIUM SERPL-SCNC: 3.9 MMOL/L — SIGNIFICANT CHANGE UP (ref 3.5–5.3)
POTASSIUM SERPL-SCNC: 3.9 MMOL/L — SIGNIFICANT CHANGE UP (ref 3.5–5.3)
PROT SERPL-MCNC: 6.4 G/DL — SIGNIFICANT CHANGE UP (ref 6–8.3)
PROT SERPL-MCNC: 6.4 G/DL — SIGNIFICANT CHANGE UP (ref 6–8.3)
PROT UR-MCNC: NEGATIVE MG/DL — SIGNIFICANT CHANGE UP
PROT UR-MCNC: NEGATIVE MG/DL — SIGNIFICANT CHANGE UP
PROT UR-MCNC: SIGNIFICANT CHANGE UP MG/DL
PROT UR-MCNC: SIGNIFICANT CHANGE UP MG/DL
RBC # BLD: 3.73 M/UL — LOW (ref 3.8–5.2)
RBC # BLD: 3.73 M/UL — LOW (ref 3.8–5.2)
RBC # FLD: 12.9 % — SIGNIFICANT CHANGE UP (ref 10.3–14.5)
RBC # FLD: 12.9 % — SIGNIFICANT CHANGE UP (ref 10.3–14.5)
RBC CASTS # UR COMP ASSIST: 2 /HPF — SIGNIFICANT CHANGE UP (ref 0–4)
RBC CASTS # UR COMP ASSIST: 2 /HPF — SIGNIFICANT CHANGE UP (ref 0–4)
RBC CASTS # UR COMP ASSIST: 3 /HPF — SIGNIFICANT CHANGE UP (ref 0–4)
RBC CASTS # UR COMP ASSIST: 3 /HPF — SIGNIFICANT CHANGE UP (ref 0–4)
REVIEW: SIGNIFICANT CHANGE UP
RSV RNA NPH QL NAA+NON-PROBE: SIGNIFICANT CHANGE UP
RSV RNA NPH QL NAA+NON-PROBE: SIGNIFICANT CHANGE UP
SAO2 % BLDV: 73.5 % — SIGNIFICANT CHANGE UP (ref 67–88)
SAO2 % BLDV: 73.5 % — SIGNIFICANT CHANGE UP (ref 67–88)
SARS-COV-2 RNA SPEC QL NAA+PROBE: SIGNIFICANT CHANGE UP
SARS-COV-2 RNA SPEC QL NAA+PROBE: SIGNIFICANT CHANGE UP
SODIUM SERPL-SCNC: 138 MMOL/L — SIGNIFICANT CHANGE UP (ref 135–145)
SODIUM SERPL-SCNC: 138 MMOL/L — SIGNIFICANT CHANGE UP (ref 135–145)
SP GR SPEC: 1.02 — SIGNIFICANT CHANGE UP (ref 1–1.03)
SQUAMOUS # UR AUTO: 1 /HPF — SIGNIFICANT CHANGE UP (ref 0–5)
SQUAMOUS # UR AUTO: 1 /HPF — SIGNIFICANT CHANGE UP (ref 0–5)
SQUAMOUS # UR AUTO: 6 /HPF — HIGH (ref 0–5)
SQUAMOUS # UR AUTO: 6 /HPF — HIGH (ref 0–5)
UROBILINOGEN FLD QL: 0.2 MG/DL — SIGNIFICANT CHANGE UP (ref 0.2–1)
WBC # BLD: 11.27 K/UL — HIGH (ref 3.8–10.5)
WBC # BLD: 11.27 K/UL — HIGH (ref 3.8–10.5)
WBC # FLD AUTO: 11.27 K/UL — HIGH (ref 3.8–10.5)
WBC # FLD AUTO: 11.27 K/UL — HIGH (ref 3.8–10.5)
WBC UR QL: 6 /HPF — HIGH (ref 0–5)
WBC UR QL: 6 /HPF — HIGH (ref 0–5)
WBC UR QL: 8 /HPF — HIGH (ref 0–5)
WBC UR QL: 8 /HPF — HIGH (ref 0–5)

## 2024-01-13 PROCEDURE — 85018 HEMOGLOBIN: CPT

## 2024-01-13 PROCEDURE — 74177 CT ABD & PELVIS W/CONTRAST: CPT | Mod: 26,MG,XU

## 2024-01-13 PROCEDURE — 99285 EMERGENCY DEPT VISIT HI MDM: CPT

## 2024-01-13 PROCEDURE — 82947 ASSAY GLUCOSE BLOOD QUANT: CPT

## 2024-01-13 PROCEDURE — 82803 BLOOD GASES ANY COMBINATION: CPT

## 2024-01-13 PROCEDURE — 74177 CT ABD & PELVIS W/CONTRAST: CPT | Mod: MG

## 2024-01-13 PROCEDURE — 83605 ASSAY OF LACTIC ACID: CPT

## 2024-01-13 PROCEDURE — 82435 ASSAY OF BLOOD CHLORIDE: CPT

## 2024-01-13 PROCEDURE — 99283 EMERGENCY DEPT VISIT LOW MDM: CPT | Mod: 24

## 2024-01-13 PROCEDURE — 82330 ASSAY OF CALCIUM: CPT

## 2024-01-13 PROCEDURE — 74178 CT ABD&PLV WO CNTR FLWD CNTR: CPT | Mod: MA

## 2024-01-13 PROCEDURE — 84295 ASSAY OF SERUM SODIUM: CPT

## 2024-01-13 PROCEDURE — 71046 X-RAY EXAM CHEST 2 VIEWS: CPT

## 2024-01-13 PROCEDURE — 87040 BLOOD CULTURE FOR BACTERIA: CPT

## 2024-01-13 PROCEDURE — 84132 ASSAY OF SERUM POTASSIUM: CPT

## 2024-01-13 PROCEDURE — 87186 SC STD MICRODIL/AGAR DIL: CPT

## 2024-01-13 PROCEDURE — 81001 URINALYSIS AUTO W/SCOPE: CPT

## 2024-01-13 PROCEDURE — 85025 COMPLETE CBC W/AUTO DIFF WBC: CPT

## 2024-01-13 PROCEDURE — 74178 CT ABD&PLV WO CNTR FLWD CNTR: CPT | Mod: 26,MA

## 2024-01-13 PROCEDURE — 87086 URINE CULTURE/COLONY COUNT: CPT

## 2024-01-13 PROCEDURE — G1004: CPT

## 2024-01-13 PROCEDURE — 36415 COLL VENOUS BLD VENIPUNCTURE: CPT

## 2024-01-13 PROCEDURE — 80053 COMPREHEN METABOLIC PANEL: CPT

## 2024-01-13 PROCEDURE — 85014 HEMATOCRIT: CPT

## 2024-01-13 PROCEDURE — 99284 EMERGENCY DEPT VISIT MOD MDM: CPT | Mod: 25

## 2024-01-13 PROCEDURE — 87637 SARSCOV2&INF A&B&RSV AMP PRB: CPT

## 2024-01-13 PROCEDURE — 71046 X-RAY EXAM CHEST 2 VIEWS: CPT | Mod: 26

## 2024-01-13 RX ORDER — METRONIDAZOLE 500 MG
1 TABLET ORAL
Qty: 14 | Refills: 0
Start: 2024-01-13 | End: 2024-01-19

## 2024-01-13 RX ORDER — ACETAMINOPHEN 500 MG
975 TABLET ORAL ONCE
Refills: 0 | Status: COMPLETED | OUTPATIENT
Start: 2024-01-13 | End: 2024-01-13

## 2024-01-13 RX ORDER — METRONIDAZOLE 500 MG
500 TABLET ORAL ONCE
Refills: 0 | Status: COMPLETED | OUTPATIENT
Start: 2024-01-13 | End: 2024-01-13

## 2024-01-13 RX ORDER — SODIUM CHLORIDE 9 MG/ML
1000 INJECTION INTRAMUSCULAR; INTRAVENOUS; SUBCUTANEOUS ONCE
Refills: 0 | Status: COMPLETED | OUTPATIENT
Start: 2024-01-13 | End: 2024-01-13

## 2024-01-13 RX ORDER — LEVOFLOXACIN 5 MG/ML
1 INJECTION, SOLUTION INTRAVENOUS
Qty: 7 | Refills: 0
Start: 2024-01-13 | End: 2024-01-19

## 2024-01-13 RX ADMIN — Medication 500 MILLIGRAM(S): at 22:15

## 2024-01-13 RX ADMIN — SODIUM CHLORIDE 1000 MILLILITER(S): 9 INJECTION INTRAMUSCULAR; INTRAVENOUS; SUBCUTANEOUS at 21:22

## 2024-01-13 NOTE — ED ADULT NURSE NOTE - NS PRO AD NO ADVANCE DIRECTIVE
Assessment and Plan:     1. Wellness examination  Her examination is satisfactory today.  She recently had a mammogram that was negative.  She is up-to-date on colon cancer screening.  She is due for bone density and we will go ahead and set that up.  She is also due for a flu shot and pneumonia vaccine today.  We reviewed the documentation for her wellness visit and she is not having any problems with activities of daily living, memory, or falling.  She has an advanced directive.  She is otherwise up-to-date on age-appropriate health maintenance.    2. Hyperlipidemia LDL goal <130  We will recheck her lipids today.  She is not having any symptoms of chest pain or palpitations.  - Lipid Cascade  - Thyroid Bandera  - Comprehensive Metabolic Panel    3. Weight gain  She is having trouble with very gradual weight gain.  I will recheck her labs today.  She will continue to monitor.  - HM2(CBC w/o Differential)  - Thyroid Bandera  - Comprehensive Metabolic Panel    4. Myalgia  She notes increasing muscle pain in the morning mostly.  It is in her upper arms and shoulders.  I will check some inflammatory markers.  Discussed perhaps getting a new pillow.  We will get back to her with the test results.  - Thyroid Bandera  - Comprehensive Metabolic Panel  - Sedimentation Rate  - C-Reactive Protein(CRP)  - CK Total    5. Skin lesion  She has a skin lesion on her nose that could be an actinic keratoses.  I am going to refer her to dermatology.  - Ambulatory referral to Dermatology    6. Needs flu shot  - Influenza,Quad,High Dose,PF 65 YR+    7. Menopause  - DXA Bone Density Scan; Future      The patient's current medical problems were reviewed.    I have had an Advance Directives discussion with the patient.  The following health maintenance schedule was reviewed with the patient and provided in printed form in the after visit summary:   Health Maintenance   Topic Date Due     HEPATITIS C SCREENING  1951     LIPID   07/02/1998     ZOSTER VACCINES (1 of 2) 07/02/2003     Pneumococcal Vaccine: 65+ Years (1 of 1 - PPSV23) 07/02/2018     DXA SCAN  07/02/2018     INFLUENZA VACCINE RULE BASED (1) 08/01/2020     MEDICARE ANNUAL WELLNESS VISIT  10/06/2021     FALL RISK ASSESSMENT  10/06/2021     MAMMOGRAM  07/13/2022     TD 18+ HE  06/10/2023     ADVANCE CARE PLANNING  10/06/2025     COLORECTAL CANCER SCREENING  03/02/2027     Pneumococcal Vaccine: Pediatrics (0 to 5 Years) and At-Risk Patients (6 to 64 Years)  Aged Out     HEPATITIS B VACCINES  Aged Out        Subjective:   Chief Complaint: Francisca Williamson is an 69 y.o. female here for an Annual Wellness visit.   HPI: She comes in for a general exam.  She has a history of hyperlipidemia and is on pravastatin.  She has a couple of concerns today.  She has been having symptoms of myalgias in the morning.  It is been going on for a while.  Is in her upper arms.  She did get a new mattress which helped a little bit.  During the day she feels a little bit better.  She is also noted weight gain which is gradual.  She continues to exercise and eat the same diet.  She has not had any problems with chest pain or palpitations or other new symptoms.  Over the last 3 days she has had some sinus congestion and slight body aches.  Her symptoms have improved.  She did reach out yesterday wondering if she should reschedule her appointment.  Unfortunately my recommendation did not get back to her.  Fortunately today she is feeling well and not having any fevers or cough or other worrisome symptoms.  She is up-to-date on mammograms.  She is due for bone density screen.  She is due for vaccines today.  She is otherwise up-to-date on age-appropriate health maintenance.    Review of Systems:  Please see above.  The rest of the review of systems are negative for all systems.    Patient Care Team:  Jelly Li MD as PCP - General (Internal Medicine)  Jelly Li MD as Assigned PCP     Patient  Active Problem List   Diagnosis     Celiac disease/sprue     GERD (gastroesophageal reflux disease)     Osteoporosis     Hyperlipidemia LDL goal <130     Diverticulosis of large intestine without hemorrhage     Osteopenia     B12 deficiency     BPV (benign positional vertigo)     History of colonic polyps     Past Medical History:   Diagnosis Date     B12 deficiency      Celiac disease      Diverticulitis      Diverticulosis      GERD (gastroesophageal reflux disease)     states main problem is heartburn     Osteoporosis      Shingles 1/1/2005      Past Surgical History:   Procedure Laterality Date     APPENDECTOMY      age 17      Family History   Problem Relation Age of Onset     Osteoporosis Mother      Stroke Mother 74     Hyperlipidemia Father      Dementia Father      Breast cancer Maternal Grandmother       Social History     Socioeconomic History     Marital status:      Spouse name: Not on file     Number of children: Not on file     Years of education: Not on file     Highest education level: Not on file   Occupational History     Occupation: [employer]    Social Needs     Financial resource strain: Not on file     Food insecurity     Worry: Not on file     Inability: Not on file     Transportation needs     Medical: Not on file     Non-medical: Not on file   Tobacco Use     Smoking status: Never Smoker     Smokeless tobacco: Never Used   Substance and Sexual Activity     Alcohol use: Yes     Alcohol/week: 7.0 standard drinks     Types: 7 Glasses of wine per week     Drug use: No     Sexual activity: Not Currently     Partners: Male   Lifestyle     Physical activity     Days per week: Not on file     Minutes per session: Not on file     Stress: Not on file   Relationships     Social connections     Talks on phone: Not on file     Gets together: Not on file     Attends [employer] service: Not on file     Active member of club or organization: Not on file     Attends meetings of clubs or  Nikolai Becerril  [phone] "organizations: Not on file     Relationship status: Not on file     Intimate partner violence     Fear of current or ex partner: Not on file     Emotionally abused: Not on file     Physically abused: Not on file     Forced sexual activity: Not on file   Other Topics Concern     Not on file   Social History Narrative     Not on file      Current Outpatient Medications   Medication Sig Dispense Refill     cyanocobalamin (VITAMIN B-12) 1,000 mcg/mL injection Inject 1 mL (1,000 mcg total) into the shoulder, thigh, or buttocks every 30 (thirty) days. 3 mL 3     ERGOCALCIFEROL, VITAMIN D2, (VITAMIN D2 ORAL) Take 2,000 Units by mouth daily.       estradioL (ESTRACE) 0.01 % (0.1 mg/gram) vaginal cream Insert 2 g into the vagina daily.       Lactobacillus rhamnosus GG (CULTURELLE) 10-15 Billion cell capsule Take 1 capsule by mouth daily.       polyethylene glycol (MIRALAX) 17 gram packet Take 17 g by mouth daily.       pravastatin (PRAVACHOL) 20 MG tablet Take 1 tablet (20 mg total) by mouth daily. 90 tablet 3     No current facility-administered medications for this visit.       Objective:   Vital Signs:   Visit Vitals  /70 (Patient Site: Right Arm, Patient Position: Sitting)   Pulse 78   Ht 5' 5.5\" (1.664 m)   Wt 157 lb (71.2 kg)   SpO2 97%   BMI 25.73 kg/m           VisionScreening:  No exam data present     PHYSICAL EXAM  General:  Patient is alert and in no apparent distress.  Neck:  Supple with no adenopathy or thyroid abnormality noted.  Cardiovascular:  Regular rate and rhythm, normal S1/S2, no murmurs, rubs, or gallop.  Pulmonary:  Lungs are clear to auscultation bilaterally with normal respiratory effort.  Gastrointestinal:  Abdomen is soft, non-tender, non-distended, with no organomegaly, rebound or guarding.  Extremities:  No joint abnormalities with no LE edema.  Strong dorsalis pedis pulses.  Neurologic Cranial nerves are intact.  No focal deficits.  Psychiatric:  Pleasant, no confusion or agitation "   Skin:  Warm and well perfused.  On her nose there is a scaly patch consistent with possible actinic keratoses.        Assessment Results 10/6/2020   Activities of Daily Living No help needed   Instrumental Activities of Daily Living No help needed   Get Up and Go Score Less than 12 seconds   Mini Cog Total Score 5   Some recent data might be hidden     A Mini-Cog score of 0-2 suggests the possibility of dementia, score of 3-5 suggests no dementia        Identified Health Risks:     The patient reports that she drinks more than one alcoholic drink per day but denies binge or excessive drinking. She was counseled and given information about possible harmful effects of excessive alcohol intake.  The patient was provided with written information regarding signs of hearing loss.  Patient's advanced directive was discussed and I am comfortable with the patient's wishes.         Nikolai Becerril  [phone]

## 2024-01-13 NOTE — ED PROVIDER NOTE - ATTENDING CONTRIBUTION TO CARE
Lito Kennedy DO: I have personally performed a face to face medical and diagnostic evaluation of the patient. I have discussed with and reviewed the Resident's and/or ACP's and/or Medical/PA/NP student's note and agree with the History, ROS, Physical Exam and MDM unless otherwise indicated. A brief summary of my personal evaluation and impression can be found below.     52 yo F with history of R breast cancer s/p R mastectomy and tamoxifen (ended on 5/2023) and recent TLH with salpingectomy 1/4 presenting with fever. Patient reports that she developed fever on Wednesday to 101.7 F with chills and some abdominal fullness with loose stools; not diarrhea. Went to her GYN office for post op follow up and had pelvis US, CBC and urine studies performed. Was instructed if she developed another fever, to present to ED. Yesterday night, reports another fever 100.7 which prompted her to come to ED. Has been taking Motrin and Tylenol for fever control; most recently several hours ago. Otherwise, denies headaches, chest pain or shortness of breath. Denies vaginal discharge or blood in urine or pain during urination noted. Has been ambulating post op and otherwise not taking other medications.    CONSTITUTIONAL: well-appearing, in NAD  SKIN: Warm dry, normal skin turgor  HEAD: NCAT  EYES: EOMI, PERRLA, no scleral icterus, conjunctiva pink  ENT: normal pharynx with no erythema or exudates  NECK: Supple; non tender. Full ROM.  CARD: RRR, no murmurs.  RESP: clear to ausculation b/l. No crackles or wheezing.  ABD: abd surgical incision sites well appearing, no discharge, abdomen soft, nontender, no rebound/guarding.   MSK: Full ROM, no bony tenderness, no pedal edema, no calf tenderness  PSYCH: Cooperative, appropriate.     Given recent procedure now p/w post op fevers, concern for infectious etiology such as uti (iso freq), pna although no uri sxs, viral uri, possible intra-abd infection or procedural related infection, less likely atelectasis, although pt has no fevers now pt took motrin this AM, and given leukocytosis yesterday will get blood cultures, cbc, cmp, vbg, ctap as pt had tvus yesterday, obgyn consultation, dispo pending imaging, antipyretics as needed. Will hold on antibiotics until source identified as vitals wnl and pt does not meet sirs criteria at this time.

## 2024-01-13 NOTE — CONSULT NOTE ADULT - CONSULT REASON
[place] [facility] [place] and Randal [place] [facility] [place]FR[place], and Randal

## 2024-01-13 NOTE — ED PROVIDER NOTE - PROGRESS NOTE DETAILS
Kamla Luque MD PGY2  Patient reassessed. comfortable and sleeping. Not endorsing any abdominal pain at this time. CT AP with evidence of mesenteric edema and pelvic fluid. GYN to further evaluate for recommendations. Kamla Luque MD PGY2  Patient reassessed and doing well. Ambulating comfortably. Pending CT urogram with oral and rectal contrast for evaluation of pelvic fluid. GYN aware that rectal contrast will need to be pushed by team. Continue to monitor. Pt reassessed no complaints. OBGYN contacted and is reviewing case pending recs pt seen by GYN. ct with concern for possible abscess vs hematoma. d/w GYN do not feel she needs admission for IV abx. pt well appaering recommending givng abx for home. will d/w pt and give very close return precautions. consider possible cdu Pt given strict return precautions expressed understanding and was advised on how to take medications

## 2024-01-13 NOTE — ED ADULT NURSE NOTE - NSFALLASSESSNEED_ED_ALL_ED
Patient notified of recent lab results and has verbalized understanding.  No further questions or concerns.     no

## 2024-01-13 NOTE — ED PROVIDER NOTE - CLINICAL SUMMARY MEDICAL DECISION MAKING FREE TEXT BOX
52 yo F with history of R breast cancer s/p R mastectomy and tamoxifen (ended on 5/2023) and recent TLH with salpingectomy 1/4 presenting with fever. Given recent history of TLH, would favor workup for post op fever to r/p PNA vs other intraabdominal pathology; noted to have pelvic fluid collections on recent US. Meeting SIRS criteria based on WBC > 15K on outpatient labs and fevers at home. Infectious workup with CBC/CMP/VBG with lactate/UA/urine cultures/CXR. CTAP to evaluate for intra-adbominal pathology. Will need GYN evaluation.

## 2024-01-13 NOTE — ED PROVIDER NOTE - NSFOLLOWUPINSTRUCTIONS_ED_ALL_ED_FT
You were diagnosed with a possible abscess on the abdomen.  Please take recommended antibiotics and follow up with the attending Andrey Keyes on Thursday.  Levaquin 500 mg once a day for 7 days   Flagyl 500 twice a day for 7 days     Please return if you develop concerning symptoms such as increased fever and temperature above 100.4 and increasing abdominal pain.  Developing fevers chills nausea vomiting along with fever and increasing abdominal pain or concerning signs on would necessitate an emergency department visit

## 2024-01-13 NOTE — ED ADULT NURSE REASSESSMENT NOTE - ANCILLARY STATUS
additional CT scan- discussed with pt/awaiting radiology
pt tolerating po contrast as dispensed and given to pt by CT tech/awaiting radiology
radiology results pending

## 2024-01-13 NOTE — ED ADULT NURSE NOTE - SKIN INTEGRITY
surgical site steri strips to mid, RT, LT and mid suprapubic intact with dry blood noted, no active bleeding, no drainage, no redness/swelling

## 2024-01-13 NOTE — ED ADULT TRIAGE NOTE - CHIEF COMPLAINT QUOTE
underwent lap hysterectomy 1/4/24 here; comes in with post op fever to 100.8; no drainage noted; sent in by surgeon dr carolina's office for post op fever

## 2024-01-13 NOTE — ED ADULT NURSE NOTE - NSFALLUNIVINTERV_ED_ALL_ED
Bed/Stretcher in lowest position, wheels locked, appropriate side rails in place/Call bell, personal items and telephone in reach/Instruct patient to call for assistance before getting out of bed/chair/stretcher/Non-slip footwear applied when patient is off stretcher/[place] to call system/Physically safe environment - no spills, clutter or unnecessary equipment/Purposeful proactive rounding/Room/bathroom lighting operational, light cord in reach Bed/Stretcher in lowest position, wheels locked, appropriate side rails in place/Call bell, personal items and telephone in reach/Instruct patient to call for assistance before getting out of bed/chair/stretcher/Non-slip footwear applied when patient is off stretcher/[place] to call system/Physically safe environment - no spills, clutter or unnecessary equipment/Purposeful proactive rounding/Room/bathroom lighting operational, light cord in reach

## 2024-01-13 NOTE — CONSULT NOTE ADULT - SUBJECTIVE AND OBJECTIVE BOX
GALINDO ANDRE  53y  Female [id number]    HPI: 52yo  POD#9 from [facility] [place], BS, and Cysto for fibroid uterus who presented for reported fevers at home. Fever was reportedly 100.7*F overnight. Of note, patient had been evaluated yesterday after having endorsed fever of 101.7*F at home and work-up outpatient was notable for a WBC of 15.1 and a sonogram was performed (see below).     ***    TVUS 2024: "There is edema in the pelvic fat and a small amount of fluid tracking in the pelvis as well as a more defined complex fluid collection measuring 2.7 x 1.2 x 1.7 cm in the midline of the pelvis."    Name of Ob/Gyn Physician: Dr. Keyes     POB:  x3, sAb x1   PGyn: Fibroids, Irregular menses, Ovarian cyst   MedHx: IBS, Breast CA 2018, s/p right mastectomy and placed on Tamoxifen x5yrs (stopped this past year)   SurgHx: Above   Meds:  Allergies:  Social: Denies any tobacco use, drug use, or alcohol use     Home meds:   Allergies    No Known Allergies    Intolerances        Hospital Meds:   MEDICATIONS  (STANDING):  acetaminophen     Tablet .. 975 milliGRAM(s) Oral once    MEDICATIONS  (PRN):    PAST MEDICAL & SURGICAL HISTORY:  Pneumonia  history -  treated with ABT  Malignant neoplasm of breast (female)  Right Dx  2018  S/P tonsillectomy  childhood  History of dental surgery  H/O right mastectomy  S/P total hysterectomy    Vital Signs Last 24 Hrs  T(C): 36.5 (2024 08:40), Max: 37.5 (2024 00:22)  T(F): 97.7 (2024 08:40), Max: 99.5 (2024 00:22)  HR: 72 (2024 08:40) (72 - 96)  BP: 105/70 (2024 08:40) (105/70 - 111/77)  BP(mean): --  RR: 18 (2024 08:40) (18 - 18)  SpO2: 99% (2024 08:40) (98% - 99%)    Parameters below as of 2024 08:40  Patient On (Oxygen Delivery Method): room air        Physical Exam:   General: Awake. Alert. No acute distress   CV: Regular rate and rhythm. No murmurs appreciated   Lungs: Clear to auscultation bilaterally. No respiratory distress   Back: No CVA tenderness  Abd: Soft, non-tender, and non-distended   (w/ Chaperone ):  No bleeding on pad.  External vulva and labia w/o lesions or skin changes seen.  Bimanual exam with no cervical motion tenderness, uterus wnl, adnexa non palpable b/l.  Cervix closed vs. Cervix dilated    cm   Speculum Exam: No active bleeding from os.  Physiologic discharge.    Ext: No calf tenderness  bilaterally    LABS:                              11.0   11.27 )-----------( 315      ( 2024 08:32 )             33.6         138  |  104  |  11  ----------------------------<  87  3.9   |  25  |  0.73    Ca    9.0      2024 08:32    TPro  6.4  /  Alb  3.7  /  TBili  0.2  /  DBili  x   /  AST  37  /  ALT  75<H>  /  AlkPhos  139<H>      I&O's Detail      Urinalysis Basic - ( 2024 08:32 )    Color: x / Appearance: x / SG: x / pH: x  Gluc: 87 mg/dL / Ketone: x  / Bili: x / Urobili: x   Blood: x / Protein: x / Nitrite: x   Leuk Esterase: x / RBC: x / WBC x   Sq Epi: x / Non Sq Epi: x / Bacteria: x        RADIOLOGY & ADDITIONAL STUDIES:   GALINDO ANDRE  53y  Female [id number]    HPI: 54yo  POD[place]9 [place] [facility] [place], and Cysto for fibroid uterus who presented for reported fevers at home. Fever was reportedly 100.7*F overnight. Of note, patient had been evaluated yesterday after having endorsed fever of 101.7*F at home and work-up outpatient was notable for a WBC of 15.1 and a sonogram was performed (see below).     ***    TVUS 2024: "There is edema in the pelvic fat and a small amount of fluid tracking in the pelvis as well as a more defined complex fluid collection measuring 2.7 x 1.2 x 1.7 cm in the midline of the pelvis."    Name of Ob/Gyn Physician: Dr. Keyes     POB:  x3, sAb x1   PGyn: Fibroids, Irregular menses, Ovarian cyst   MedHx: IBS, Breast CA 2018, s/p right mastectomy and placed on Tamoxifen x5yrs (stopped this past year)   SurgHx: Above   Meds:  Allergies:  Social: Denies any tobacco use, drug use, or alcohol use     Home meds:   Allergies    No Known Allergies    Intolerances        Hospital Meds:   MEDICATIONS  (STANDING):  acetaminophen     Tablet .. 975 milliGRAM(s) Oral once    MEDICATIONS  (PRN):    PAST MEDICAL & SURGICAL HISTORY:  Pneumonia  history -  treated with ABT  Malignant neoplasm of breast (female)  Right Dx  2018  S/P tonsillectomy  childhood  History of dental surgery  H/O right mastectomy  S/P total hysterectomy    Vital Signs Last 24 Hrs  T(C): 36.5 (2024 08:40), Max: 37.5 (2024 00:22)  T(F): 97.7 (2024 08:40), Max: 99.5 (2024 00:22)  HR: 72 (2024 08:40) (72 - 96)  BP: 105/70 (2024 08:40) (105/70 - 111/77)  BP(mean): --  RR: 18 (2024 08:40) (18 - 18)  SpO2: 99% (2024 08:40) (98% - 99%)    Parameters below as of 2024 08:40  Patient On (Oxygen Delivery Method): room air        Physical Exam:   General: Awake. Alert. No acute distress   CV: Regular rate and rhythm. No murmurs appreciated   Lungs: Clear to auscultation bilaterally. No respiratory distress   Back: No CVA tenderness  Abd: Soft, non-tender, and non-distended   (w/ Chaperone ):  No bleeding on pad.  External vulva and labia w/o lesions or skin changes seen.  Bimanual exam with no cervical motion tenderness, uterus wnl, adnexa non palpable b/l.  Cervix closed vs. Cervix dilated    cm   Speculum Exam: No active bleeding from os.  Physiologic discharge.    Ext: No calf tenderness  bilaterally    LABS:                              11.0   11.27 )-----------( 315      ( 2024 08:32 )             33.6         138  |  104  |  11  ----------------------------<  87  3.9   |  25  |  0.73    Ca    9.0      2024 08:32    TPro  6.4  /  Alb  3.7  /  TBili  0.2  /  DBili  x   /  AST  37  /  ALT  75<H>  /  AlkPhos  139<H>      I&O's Detail      Urinalysis Basic - ( 2024 08:32 )    Color: x / Appearance: x / SG: x / pH: x  Gluc: 87 mg/dL / Ketone: x  / Bili: x / Urobili: x   Blood: x / Protein: x / Nitrite: x   Leuk Esterase: x / RBC: x / WBC x   Sq Epi: x / Non Sq Epi: x / Bacteria: x        RADIOLOGY & ADDITIONAL STUDIES:   GALINDO ANDRE  53y  Female [id number]    HPI: 54yo  [place]OD[place]9 [place] [facility] [place], and Cysto for fibroid uterus who presented for reported fevers at home. Fever was reportedly 100.8*F overnight. Of note, patient had been evaluated yesterday after having endorsed fever of 101.7*F at home Thursday night and work-up outpatient was notable for a WBC of 15.1 and a sonogram was performed (see below). States that urine was obtained then. Currently, the patient denies any chest pain, shortness of breath, cough, runny nose, dysuria, vaginal discharge, back pain, abnormal vaginal bleeding, or significant abdominal pain. Reports she has been taking Ibuprofen and Acetaminophen for pain. Denies any sick contacts (does live at home with teenage daughter in school, son, , and mother)    TVUS 2024: "There is edema in the pelvic fat and a small amount of fluid tracking in the pelvis as well as a more defined complex fluid collection measuring 2.7 x 1.2 x 1.7 cm in the midline of the pelvis."    Name of Ob/Gyn Physician: Dr. Keyes     POB:  x3, sAb x1   PGyn: Fibroids, Irregular menses, Ovarian cyst   MedHx: IBS, Breast CA 2018, s/p right mastectomy and placed on Tamoxifen x5yrs (stopped this past year)   SurgHx: Above   Meds: Ibuprofen, Acetaminophen  Allergies: NKDA  Social: Denies any tobacco use, drug use, or alcohol use     Hospital Meds:   MEDICATIONS  (STANDING):  acetaminophen     Tablet .. 975 milliGRAM(s) Oral once    MEDICATIONS  (PRN):    PAST MEDICAL & SURGICAL HISTORY:  Pneumonia  history -  treated with ABT  Malignant neoplasm of breast (female)  Right Dx  2018  S/P tonsillectomy  childhood  History of dental surgery  H/O right mastectomy  S/P total hysterectomy    Vital Signs Last 24 Hrs  T(C): 36.5 (2024 08:40), Max: 37.5 (2024 00:22)  T(F): 97.7 (2024 08:40), Max: 99.5 (2024 00:22)  HR: 72 (2024 08:40) (72 - 96)  BP: 105/70 (2024 08:40) (105/70 - 111/77)  BP(mean): --  RR: 18 (2024 08:40) (18 - 18)  SpO2: 99% (2024 08:40) (98% - 99%)    Parameters below as of 2024 08:40  Patient On (Oxygen Delivery Method): room air        Physical Exam:   General: Awake. Alert. No acute distress   CV: Regular rate and rhythm. No murmurs appreciated   Lungs: Clear to auscultation bilaterally. No respiratory distress   Back: No CVA tenderness  Abd: Soft, non-tender, and non-distended. No rebound or guarding   Ext: No calf tenderness  bilaterally    LABS:                              11.0   11.27 )-----------( 315      ( 2024 08:32 )             33.6         138  |  104  |  11  ----------------------------<  87  3.9   |  25  |  0.73    Ca    9.0      2024 08:32    TPro  6.4  /  Alb  3.7  /  TBili  0.2  /  DBili  x   /  AST  37  /  ALT  75<H>  /  AlkPhos  139<H>      I&O's Detail      Urinalysis Basic - ( 2024 08:32 )    Color: x / Appearance: x / SG: x / pH: x  Gluc: 87 mg/dL / Ketone: x  / Bili: x / Urobili: x   Blood: x / Protein: x / Nitrite: x   Leuk Esterase: x / RBC: x / WBC x   Sq Epi: x / Non Sq Epi: x / Bacteria: x        RADIOLOGY & ADDITIONAL STUDIES:    EXAM: [id number] - US PELVIC COMPLETE  - ORDERED BY: CAROL TRUONG    EXAM: [id number] - US TRANSVAGINAL  - ORDERED BY: CAROL TRUONG      PROCEDURE DATE:  2024        INTERPRETATION:  CLINICAL INFORMATION: Fever and abdominal pain status post hysterectomy 2024.    COMPARISON: None available.    TECHNIQUE:  Endovaginal and transabdominal pelvic sonogram.    FINDINGS:  Uterus: Removed.    Right ovary: 3.8 cm x 3.1 cm x 2.4 cm. Within normal limits.  Left ovary: 4.6 cm x 2.8 cm x 2.7 cm. 2.0 cm dominant follicle.    Fluid: There is edema in the pelvic fat and a small amount of fluid tracking in the pelvis as well as a more defined complex fluid collection measuring 2.7 x 1.2 x 1.7 cm in the midline of the pelvis.    IMPRESSION:  Hysterectomy.    2.7 cm complex collection in the pelvis which is favored to be postoperative given the recent pelvic surgery on 2024. However, infection is not excluded.    --- End of Report ---              MELISSA TIAN MD; Attending Radiologist  This document has been electronically signed. 2024  5:35PM     GALINDO ANDRE  53y  Female [id number]    HPI: 54yo  [place] [facility] [place] fibroid uterus who presented for reported fevers at home. Fever was reportedly 100.8*F overnight. Of note, patient had been evaluated yesterday after having endorsed fever of 101.7*F at home Thursday night and work-up outpatient was notable for a WBC of 15.1 and a sonogram was performed (see below). States that urine was obtained then. Currently, the patient denies any chest pain, shortness of breath, cough, runny nose, dysuria, vaginal discharge, back pain, abnormal vaginal bleeding, or significant abdominal pain. Reports she has been taking Ibuprofen and Acetaminophen for pain. Denies any sick contacts (does live at home with teenage daughter in school, son, , and mother)    TVUS 2024: "There is edema in the pelvic fat and a small amount of fluid tracking in the pelvis as well as a more defined complex fluid collection measuring 2.7 x 1.2 x 1.7 cm in the midline of the pelvis."    Name of Ob/Gyn Physician: Dr. Keyes     POB:  x3, sAb x1   PGyn: Fibroids, Irregular menses, Ovarian cyst   MedHx: IBS, Breast CA 2018, s/p right mastectomy and placed on Tamoxifen x5yrs (stopped this past year)   SurgHx: Above   Meds: Ibuprofen, Acetaminophen  Allergies: NKDA  Social: Denies any tobacco use, drug use, or alcohol use     Hospital Meds:   MEDICATIONS  (STANDING):  acetaminophen     Tablet .. 975 milliGRAM(s) Oral once    MEDICATIONS  (PRN):    PAST MEDICAL & SURGICAL HISTORY:  Pneumonia  history -  treated with ABT  Malignant neoplasm of breast (female)  Right Dx  2018  S/P tonsillectomy  childhood  History of dental surgery  H/O right mastectomy  S/P total hysterectomy    Vital Signs Last 24 Hrs  T(C): 36.5 (2024 08:40), Max: 37.5 (2024 00:22)  T(F): 97.7 (2024 08:40), Max: 99.5 (2024 00:22)  HR: 72 (2024 08:40) (72 - 96)  BP: 105/70 (2024 08:40) (105/70 - 111/77)  BP(mean): --  RR: 18 (2024 08:40) (18 - 18)  SpO2: 99% (2024 08:40) (98% - 99%)    Parameters below as of 2024 08:40  Patient On (Oxygen Delivery Method): room air        Physical Exam:   General: Awake. Alert. No acute distress   CV: Regular rate and rhythm. No murmurs appreciated   Lungs: Clear to auscultation bilaterally. No respiratory distress   Back: No CVA tenderness  Abd: Soft, non-tender, and non-distended. No rebound or guarding   Ext: No calf tenderness  bilaterally    LABS:                              11.0   11.27 )-----------( 315      ( 2024 08:32 )             33.6         138  |  104  |  11  ----------------------------<  87  3.9   |  25  |  0.73    Ca    9.0      2024 08:32    TPro  6.4  /  Alb  3.7  /  TBili  0.2  /  DBili  x   /  AST  37  /  ALT  75<H>  /  AlkPhos  139<H>      I&O's Detail      Urinalysis Basic - ( 2024 08:32 )    Color: x / Appearance: x / SG: x / pH: x  Gluc: 87 mg/dL / Ketone: x  / Bili: x / Urobili: x   Blood: x / Protein: x / Nitrite: x   Leuk Esterase: x / RBC: x / WBC x   Sq Epi: x / Non Sq Epi: x / Bacteria: x        RADIOLOGY & ADDITIONAL STUDIES:    EXAM: [id number] - US PELVIC COMPLETE  - ORDERED BY: CAROL TRUONG    EXAM: [id number] - US TRANSVAGINAL  - ORDERED BY: CAROL TRUONG      PROCEDURE DATE:  2024        INTERPRETATION:  CLINICAL INFORMATION: Fever and abdominal pain status post hysterectomy 2024.    COMPARISON: None available.    TECHNIQUE:  Endovaginal and transabdominal pelvic sonogram.    FINDINGS:  Uterus: Removed.    Right ovary: 3.8 cm x 3.1 cm x 2.4 cm. Within normal limits.  Left ovary: 4.6 cm x 2.8 cm x 2.7 cm. 2.0 cm dominant follicle.    Fluid: There is edema in the pelvic fat and a small amount of fluid tracking in the pelvis as well as a more defined complex fluid collection measuring 2.7 x 1.2 x 1.7 cm in the midline of the pelvis.    IMPRESSION:  Hysterectomy.    2.7 cm complex collection in the pelvis which is favored to be postoperative given the recent pelvic surgery on 2024. However, infection is not excluded.    --- End of Report ---              MELISSA TIAN MD; Attending Radiologist  This document has been electronically signed. 2024  5:35PM     GALINDO ANDRE  53y  Female [id number]    HPI: 52yo  POD#9 from [facility] [place], and Cysto for fibroid uterus who presented for reported fevers at home. Fever was reportedly 100.8*F overnight. Of note, patient had been evaluated yesterday after having endorsed fever of 101.7*F at home Thursday night and work-up outpatient was notable for a WBC of 15.1 and a sonogram was performed (see below). States that urine was obtained then. Currently, the patient denies any chest pain, shortness of breath, cough, runny nose, dysuria, vaginal discharge, back pain, abnormal vaginal bleeding, or significant abdominal pain. Reports she has been taking Ibuprofen and Acetaminophen for pain. Denies any sick contacts (does live at home with teenage daughter in school, son, , and mother)    TVUS 2024: "There is edema in the pelvic fat and a small amount of fluid tracking in the pelvis as well as a more defined complex fluid collection measuring 2.7 x 1.2 x 1.7 cm in the midline of the pelvis."    Name of Ob/Gyn Physician: Dr. Keyes     POB:  x3, sAb x1   PGyn: Fibroids, Irregular menses, Ovarian cyst   MedHx: IBS, Breast CA 2018, s/p right mastectomy and placed on Tamoxifen x5yrs (stopped this past year)   SurgHx: Above   Meds: Ibuprofen, Acetaminophen  Allergies: NKDA  Social: Denies any tobacco use, drug use, or alcohol use     Hospital Meds:   MEDICATIONS  (STANDING):  acetaminophen     Tablet .. 975 milliGRAM(s) Oral once    MEDICATIONS  (PRN):    PAST MEDICAL & SURGICAL HISTORY:  Pneumonia  history -  treated with ABT  Malignant neoplasm of breast (female)  Right Dx  2018  S/P tonsillectomy  childhood  History of dental surgery  H/O right mastectomy  S/P total hysterectomy    Vital Signs Last 24 Hrs  T(C): 36.5 (2024 08:40), Max: 37.5 (2024 00:22)  T(F): 97.7 (2024 08:40), Max: 99.5 (2024 00:22)  HR: 72 (2024 08:40) (72 - 96)  BP: 105/70 (2024 08:40) (105/70 - 111/77)  BP(mean): --  RR: 18 (2024 08:40) (18 - 18)  SpO2: 99% (2024 08:40) (98% - 99%)    Parameters below as of 2024 08:40  Patient On (Oxygen Delivery Method): room air        Physical Exam:   General: Awake. Alert. No acute distress   CV: Regular rate and rhythm. No murmurs appreciated   Lungs: Clear to auscultation bilaterally. No respiratory distress   Back: No CVA tenderness  Abd: Soft, non-tender, and non-distended. No rebound or guarding   Ext: No calf tenderness  bilaterally    LABS:                              11.0   11.27 )-----------( 315      ( 2024 08:32 )             33.6         138  |  104  |  11  ----------------------------<  87  3.9   |  25  |  0.73    Ca    9.0      2024 08:32    TPro  6.4  /  Alb  3.7  /  TBili  0.2  /  DBili  x   /  AST  37  /  ALT  75<H>  /  AlkPhos  139<H>      I&O's Detail      Urinalysis Basic - ( 2024 08:32 )    Color: x / Appearance: x / SG: x / pH: x  Gluc: 87 mg/dL / Ketone: x  / Bili: x / Urobili: x   Blood: x / Protein: x / Nitrite: x   Leuk Esterase: x / RBC: x / WBC x   Sq Epi: x / Non Sq Epi: x / Bacteria: x        RADIOLOGY & ADDITIONAL STUDIES:    EXAM: [id number] - US PELVIC COMPLETE  - ORDERED BY: CAROL TRUONG    EXAM: [id number] - US TRANSVAGINAL  - ORDERED BY: CAROL TRUONG      PROCEDURE DATE:  2024        INTERPRETATION:  CLINICAL INFORMATION: Fever and abdominal pain status post hysterectomy 2024.    COMPARISON: None available.    TECHNIQUE:  Endovaginal and transabdominal pelvic sonogram.    FINDINGS:  Uterus: Removed.    Right ovary: 3.8 cm x 3.1 cm x 2.4 cm. Within normal limits.  Left ovary: 4.6 cm x 2.8 cm x 2.7 cm. 2.0 cm dominant follicle.    Fluid: There is edema in the pelvic fat and a small amount of fluid tracking in the pelvis as well as a more defined complex fluid collection measuring 2.7 x 1.2 x 1.7 cm in the midline of the pelvis.    IMPRESSION:  Hysterectomy.    2.7 cm complex collection in the pelvis which is favored to be postoperative given the recent pelvic surgery on 2024. However, infection is not excluded.    --- End of Report ---              MELISSA TIAN MD; Attending Radiologist  This document has been electronically signed. 2024  5:35PM    < from: CT Abdomen and Pelvis w/ IV Cont (24 @ 11:11) >    ACC: [id number] EXAM:  CT ABDOMEN AND PELVIS IC   ORDERED BY: DANNY HICKS     PROCEDURE DATE:  2024          INTERPRETATION:  CLINICAL INFORMATION: Postoperative. Right breast cancer   status post mastectomy. Recent total laparoscopichysterectomy with   salpingectomy on 2024 and presents with fever.    COMPARISON: None.    CONTRAST/COMPLICATIONS:  IV Contrast: Omnipaque 350  90 cc administered   10 cc discarded  Oral Contrast: NONE  Complications: None reported at time of study completion    PROCEDURE:  CT of the Abdomen and Pelvis was performed.  Sagittal and coronal reformats were performed.    FINDINGS:  LOWER CHEST: Partially bilateral breast implants.    LIVER: 1 cm cyst segment 4B.  BILE DUCTS: Normal caliber.  GALLBLADDER: Within normal limits.  SPLEEN: Within normal limits.  PANCREAS: Within normal limits.  ADRENALS: Within normal limits.  KIDNEYS/URETERS: Within normal limits.    BLADDER: Within normal limits.  REPRODUCTIVE ORGANS: Status post hysterectomy. Extensive infiltration of   the mesenteric fat in the pelvis. 1.9 cm pocket of fluid in the right   side of the pelvis and 2.5 cm cystic structure in the left adnexa, likely   an ovarian follicle/cysts.    BOWEL: Limited evaluation without oral contrast. No bowel obstruction.   Appendix is not visualized. There is infiltration of the mesenteric fat   in the pelvis with dispersed fluid. Difficult to distinguish sigmoid   colon from small bowel loops in the pelvis. No definite free air is   identified.  PERITONEUM: Small pelvic ascites.  VESSELS: Within normal limits.  RETROPERITONEUM/LYMPH NODES: No lymphadenopathy.  ABDOMINAL WALL: Postsurgical changes.  BONES: Degenerative changes.    IMPRESSION:  Status post hysterectomy. Extensive mesentericedema and dispersed fluid   in the pelvis. Recommend follow-up CT with oral and possibly rectal   contrast to exclude bowel or ureteral injury.    --- End of Report ---           ARTEM JIANG MD; Resident Radiologist  This document has been electronically signed.  JOSHUA SLADE MD; Attending Radiologist  This document has been electronically signed. 2024  1:08PM    < end of copied text >     GALINDO ANDRE  53y  Female [id number]    HPI: 54yo  POD#9 from [facility] [place], BS, and Cysto for fibroid uterus who presented for reported fevers at home. Fever was reportedly 100.8*F overnight. Of note, patient had been evaluated yesterday after having endorsed fever of 101.7*F at home Thursday night and work-up outpatient was notable for a WBC of 15.1 and a sonogram was performed (see below). States that urine was obtained then. Currently, the patient denies any chest pain, shortness of breath, cough, runny nose, dysuria, vaginal discharge, back pain, abnormal vaginal bleeding, or significant abdominal pain. Reports she has been taking Ibuprofen and Acetaminophen for pain. Denies any sick contacts (does live at home with teenage daughter in school, son, , and mother)    TVUS 2024: "There is edema in the pelvic fat and a small amount of fluid tracking in the pelvis as well as a more defined complex fluid collection measuring 2.7 x 1.2 x 1.7 cm in the midline of the pelvis."    Name of Ob/Gyn Physician: Dr. Keyes     POB:  x3, sAb x1   PGyn: Fibroids, Irregular menses, Ovarian cyst   MedHx: IBS, Breast CA 2018, s/p right mastectomy and placed on Tamoxifen x5yrs (stopped this past year)   SurgHx: Above   Meds: Ibuprofen, Acetaminophen  Allergies: NKDA  Social: Denies any tobacco use, drug use, or alcohol use     Hospital Meds:   MEDICATIONS  (STANDING):  acetaminophen     Tablet .. 975 milliGRAM(s) Oral once    MEDICATIONS  (PRN):    PAST MEDICAL & SURGICAL HISTORY:  Pneumonia  history -  treated with ABT  Malignant neoplasm of breast (female)  Right Dx  2018  S/P tonsillectomy  childhood  History of dental surgery  H/O right mastectomy  S/P total hysterectomy    Vital Signs Last 24 Hrs  T(C): 36.5 (2024 08:40), Max: 37.5 (2024 00:22)  T(F): 97.7 (2024 08:40), Max: 99.5 (2024 00:22)  HR: 72 (2024 08:40) (72 - 96)  BP: 105/70 (2024 08:40) (105/70 - 111/77)  BP(mean): --  RR: 18 (2024 08:40) (18 - 18)  SpO2: 99% (2024 08:40) (98% - 99%)    Parameters below as of 2024 08:40  Patient On (Oxygen Delivery Method): room air        Physical Exam:   General: Awake. Alert. No acute distress   CV: Regular rate and rhythm. No murmurs appreciated   Lungs: Clear to auscultation bilaterally. No respiratory distress   Back: No CVA tenderness  Abd: Soft, non-tender, and non-distended. No rebound or guarding   Ext: No calf tenderness  bilaterally    LABS:                              11.0   11.27 )-----------( 315      ( 2024 08:32 )             33.6         138  |  104  |  11  ----------------------------<  87  3.9   |  25  |  0.73    Ca    9.0      2024 08:32    TPro  6.4  /  Alb  3.7  /  TBili  0.2  /  DBili  x   /  AST  37  /  ALT  75<H>  /  AlkPhos  139<H>      I&O's Detail      Urinalysis Basic - ( 2024 08:32 )    Color: x / Appearance: x / SG: x / pH: x  Gluc: 87 mg/dL / Ketone: x  / Bili: x / Urobili: x   Blood: x / Protein: x / Nitrite: x   Leuk Esterase: x / RBC: x / WBC x   Sq Epi: x / Non Sq Epi: x / Bacteria: x        RADIOLOGY & ADDITIONAL STUDIES:    EXAM: [id number] - US PELVIC COMPLETE  - ORDERED BY: CAROL TRUONG    EXAM: [id number] - US TRANSVAGINAL  - ORDERED BY: CAROL TRUONG      PROCEDURE DATE:  2024        INTERPRETATION:  CLINICAL INFORMATION: Fever and abdominal pain status post hysterectomy 2024.    COMPARISON: None available.    TECHNIQUE:  Endovaginal and transabdominal pelvic sonogram.    FINDINGS:  Uterus: Removed.    Right ovary: 3.8 cm x 3.1 cm x 2.4 cm. Within normal limits.  Left ovary: 4.6 cm x 2.8 cm x 2.7 cm. 2.0 cm dominant follicle.    Fluid: There is edema in the pelvic fat and a small amount of fluid tracking in the pelvis as well as a more defined complex fluid collection measuring 2.7 x 1.2 x 1.7 cm in the midline of the pelvis.    IMPRESSION:  Hysterectomy.    2.7 cm complex collection in the pelvis which is favored to be postoperative given the recent pelvic surgery on 2024. However, infection is not excluded.    --- End of Report ---              MELISSA TIAN MD; Attending Radiologist  This document has been electronically signed. 2024  5:35PM    < from: CT Abdomen and Pelvis w/ IV Cont (24 @ 11:11) >    ACC: [id number] EXAM:  CT ABDOMEN AND PELVIS IC   ORDERED BY: DANNY HICKS     PROCEDURE DATE:  2024          INTERPRETATION:  CLINICAL INFORMATION: Postoperative. Right breast cancer   status post mastectomy. Recent total laparoscopichysterectomy with   salpingectomy on 2024 and presents with fever.    COMPARISON: None.    CONTRAST/COMPLICATIONS:  IV Contrast: Omnipaque 350  90 cc administered   10 cc discarded  Oral Contrast: NONE  Complications: None reported at time of study completion    PROCEDURE:  CT of the Abdomen and Pelvis was performed.  Sagittal and coronal reformats were performed.    FINDINGS:  LOWER CHEST: Partially bilateral breast implants.    LIVER: 1 cm cyst segment 4B.  BILE DUCTS: Normal caliber.  GALLBLADDER: Within normal limits.  SPLEEN: Within normal limits.  PANCREAS: Within normal limits.  ADRENALS: Within normal limits.  KIDNEYS/URETERS: Within normal limits.    BLADDER: Within normal limits.  REPRODUCTIVE ORGANS: Status post hysterectomy. Extensive infiltration of   the mesenteric fat in the pelvis. 1.9 cm pocket of fluid in the right   side of the pelvis and 2.5 cm cystic structure in the left adnexa, likely   an ovarian follicle/cysts.    BOWEL: Limited evaluation without oral contrast. No bowel obstruction.   Appendix is not visualized. There is infiltration of the mesenteric fat   in the pelvis with dispersed fluid. Difficult to distinguish sigmoid   colon from small bowel loops in the pelvis. No definite free air is   identified.  PERITONEUM: Small pelvic ascites.  VESSELS: Within normal limits.  RETROPERITONEUM/LYMPH NODES: No lymphadenopathy.  ABDOMINAL WALL: Postsurgical changes.  BONES: Degenerative changes.    IMPRESSION:  Status post hysterectomy. Extensive mesentericedema and dispersed fluid   in the pelvis. Recommend follow-up CT with oral and possibly rectal   contrast to exclude bowel or ureteral injury.    --- End of Report ---           ARTEM JIANG MD; Resident Radiologist  This document has been electronically signed.  JOSHUA SLADE MD; Attending Radiologist  This document has been electronically signed. 2024  1:08PM    < end of copied text >

## 2024-01-13 NOTE — ED PROVIDER NOTE - OBJECTIVE STATEMENT
52 yo F with history of R breast cancer s/p R mastectomy and tamoxifen (ended on 5/2023) and recent TLH with salpingectomy 1/4 presenting with fever. Patient reports that she developed fever on Wednesday to 101.7 F with chills and some abdominal fullness with loose stools; not diarrhea. Went to her GYN office for post op follow up and had pelvis US, CBC and urine studies performed. Was instructed if she developed another fever, to present to ED. Yesterday night, reports another fever 100.7 which prompted her to come to ED. Has been taking Motrin and Tylenol for fever control; most recently several hours ago. Otherwise, denies headaches, chest pain or shortness of breath. Denies vaginal discharge or blood in urine or pain during urination noted. Has been ambulating post op and otherwise not taking other medications.

## 2024-01-13 NOTE — ED PROVIDER NOTE - CARE PROVIDER_API CALL
Andrey Keyes  Obstetrics and Gynecology  [address] [place] [address], [address]  [place] [address]  Phone: [phone]  Fax: [phone]  Follow Up Time:    Andrey Keyes  Obstetrics and Gynecology  [address] [place] [address], [address]  [place] [address]  Phone: [phone]  Fax: [phone]  Follow Up Time:

## 2024-01-13 NOTE — ED ADULT NURSE REASSESSMENT NOTE - COMFORT CARE
plan of care explained/wait time explained
plan of care explained/wait time explained
plan of care explained/side rails up/wait time explained

## 2024-01-13 NOTE — ED ADULT NURSE NOTE - VOIDING
[FreeTextEntry1] : urine culture sent\par cipro- RBA of medication d/w patient including risk of tendon rupture. RTO or go to ER if Achilles tendon pain or discomfort\par RTO if you don’t feel better, or you feel worse without difficulty

## 2024-01-13 NOTE — CONSULT NOTE ADULT - ASSESSMENT
54yo  [place] [facility] [place], and Cysto for fibroid uterus who presented for reported fevers at home, 100.8*F. Patient otherwise not endorsing any symptoms and is afebrile here with reassuring VS. Patient without URI sx (negative for COVID, Flu, and RSV today) and exam is benign. TVUS obtained yesterday outpatient with a 2.7cm complex collection that is favored to be post-op and at this point, CT ordered by the ED is pending. WBC is downtrending at 11.3 which was 15.1 yesterday.     - Will follow-up UA and CT. Plan of care pending CT results     Dennys Mederos  PGY-2, Obstetrics & Gynecology     ***Incomplete note***  52yo  [place] [facility] [place], and Cysto for fibroid uterus who presented for reported fevers at home, 100.8*F. Patient otherwise not endorsing any symptoms and is afebrile here with reassuring VS. Patient without URI sx (negative for COVID, Flu, and RSV today) and exam is benign. TVUS obtained yesterday outpatient with a 2.7cm complex collection that is favored to be post-op and at this point, CT ordered by the ED is pending. WBC is downtrending at 11.3 which was 15.1 yesterday.     - Will follow-up UA and CT. Plan of care pending CT results     Dennys Mederos  PGY-2, Obstetrics & Gynecology     ***Incomplete note***  52yo  [place] [facility] [place] for fibroid uterus who presented for reported fevers at home, 100.8*F. Patient otherwise not endorsing any symptoms and is afebrile here with reassuring VS. Patient without URI sx (negative for COVID, Flu, and RSV today) and exam is benign. TVUS obtained yesterday outpatient with a 2.7cm complex collection that is favored to be post-op and at this point, CT ordered by the ED is pending. WBC is downtrending at 11.3 which was 15.1 yesterday.     - Will follow-up UA and CT. Plan of care pending CT results     Addendum    Patient re-evaluated @1358. Patient reports minimal abdominal pain but is otherwise without complaints. Reviewed plan of care for further evaluation of the bowels and ureter. Patient is amendable to plan of care. Currently awaiting radiology call-back to determine order of imaging to evaluate     Dennys Gatito  PGY-2, Obstetrics & Gynecology     d/w Dr. GULSHAN Garcia, MIS fellow  54yo  [place]OD[place] [facility] [place], [place], and Cysto for fibroid uterus who presented for reported fevers at home, 100.8*F. Patient otherwise not endorsing any symptoms and is afebrile here with reassuring VS. Patient without URI sx (negative for COVID, Flu, and RSV today) and exam is benign. TVUS obtained yesterday outpatient with a 2.7cm complex collection that is favored to be post-op and at this point, CT ordered by the ED is pending. WBC is downtrending at 11.3 which was 15.1 yesterday.     - Will follow-up UA and CT. Plan of care pending CT results     Addendum    Patient re-evaluated @1358. Patient reports minimal abdominal pain but is otherwise without complaints. Reviewed plan of care for further evaluation of the bowels and ureter. Patient is amendable to plan of care. Currently awaiting radiology call-back to determine order of imaging to evaluate     Dennys Gatito  PGY-2, Obstetrics & Gynecology     d/w Dr. GULSHAN Garcia, MIS fellow  54yo  [place] [facility] [place], and Cysto for fibroid uterus who presented for reported fevers at home, 100.8*F. Patient otherwise not endorsing any symptoms and is afebrile here with reassuring VS. Patient without URI sx (negative for COVID, Flu, and RSV today) and exam is benign. TVUS obtained yesterday outpatient with a 2.7cm complex collection that is favored to be post-op and at this point, CT ordered by the ED is pending. WBC is downtrending at 11.3 which was 15.1 yesterday.     - Will follow-up UA and CT. Plan of care pending CT results     Addendum    Patient re-evaluated @1358. Patient reports minimal abdominal pain but is otherwise without complaints. Denies any history of loose stool or issues with BMs currently (stated potential loose stool several days ago). Reviewed plan of care for further evaluation of the bowels and ureter. Patient is amendable to plan of care. Currently awaiting radiology call-back to determine order of imaging to evaluate   - Abdomen: Soft. Non-tender     Dennys Mederos  PGY-2, Obstetrics & Gynecology     d/w Dr. GULSHAN Garcia, MIS fellow  52yo  [place] [facility] [place] and Cysto for fibroid uterus who presented for reported fevers at home, 100.8*F. Patient otherwise not endorsing any symptoms and is afebrile here with reassuring VS. Patient without URI sx (negative for COVID, Flu, and RSV today) and exam is benign. TVUS obtained yesterday outpatient with a 2.7cm complex collection that is favored to be post-op and at this point, CT ordered by the ED is pending. WBC is downtrending at 11.3 which was 15.1 yesterday.     - Will follow-up UA and CT. Plan of care pending CT results     Addendum    Patient re-evaluated @1358. Patient reports minimal abdominal pain but is otherwise without complaints. Denies any history of loose stool or issues with BMs currently (stated potential loose stool several days ago). Reviewed plan of care for further evaluation of the bowels and ureter. Patient is amendable to plan of care. Currently awaiting radiology call-back to determine order of imaging to evaluate   - Abdomen: Soft. Non-tender     Dennys Mederos  PGY-2, Obstetrics & Gynecology     d/w Dr. GULSHAN Garcia, MIS fellow  54yo  [place] [facility] [place], and Cysto for fibroid uterus who presented for reported fevers at home, 100.8*F. Patient otherwise not endorsing any symptoms and is afebrile here with reassuring VS. Patient without URI sx (negative for COVID, Flu, and RSV today) and exam is benign. TVUS obtained yesterday outpatient with a 2.7cm complex collection that is favored to be post-op and at this point, CT ordered by the ED is pending. WBC is downtrending at 11.3 which was 15.1 yesterday.     - Will follow-up UA and CT. Plan of care pending CT results     Addendum    Patient re-evaluated @1358. Patient reports minimal abdominal pain but is otherwise without complaints. Denies any history of loose stool or issues with BMs currently (stated potential loose stool several days ago). Reviewed plan of care for further evaluation of the bowels and ureter. Patient is amendable to plan of care. Currently awaiting radiology call-back to determine order of imaging to evaluate   - Gen: Sitting in stretcher comfortably working on iPad  - Abdomen: Soft. Non-tender     Dennys Mederos  PGY-2, Obstetrics & Gynecology     d/w Dr. GULSHAN Garcia, MIS fellow  54yo  [place] [facility] [place]ysto for fibroid uterus who presented for reported fevers at home, 100.8*F. Patient otherwise not endorsing any symptoms and is afebrile here with reassuring VS. Patient without URI sx (negative for COVID, Flu, and RSV today) and exam is benign. TVUS obtained yesterday outpatient with a 2.7cm complex collection that is favored to be post-op and at this point, CT ordered by the ED is pending. WBC is downtrending at 11.3 which was 15.1 yesterday.     - Will follow-up UA and CT. Plan of care pending CT results     Addendum    Patient re-evaluated @1358. Patient reports minimal abdominal pain but is otherwise without complaints. Denies any history of loose stool or issues with BMs currently (stated potential loose stool several days ago). Reviewed plan of care for further evaluation of the bowels and ureter. Patient is amendable to plan of care. Currently awaiting radiology call-back to determine order of imaging to evaluate   - Gen: Sitting in stretcher comfortably working on iPad  - Abdomen: Soft. Non-tender     Dennys Mederos  PGY-2, Obstetrics & Gynecology     d/w Dr. GULSHAN Garcia, MIS fellow  52yo  POD#9 from [facility] [place], BS, and Cysto for fibroid uterus who presented for reported fevers at home, 100.8*F. Patient otherwise not endorsing any symptoms and is afebrile here with reassuring VS. Patient without URI sx (negative for COVID, Flu, and RSV today) and exam is benign. TVUS obtained yesterday outpatient with a 2.7cm complex collection that is favored to be post-op and at this point, CT ordered by the ED is pending. WBC is downtrending at 11.3 which was 15.1 yesterday.     - Will follow-up UA and CT. Plan of care pending CT results     Addendum    Patient re-evaluated @1358. Patient reports minimal abdominal pain but is otherwise without complaints. Denies any history of loose stool or issues with BMs currently (stated potential loose stool several days ago). Reviewed plan of care for further evaluation of the bowels and ureter. Patient is amendable to plan of care. Currently awaiting radiology call-back to determine order of imaging to evaluate   - Gen: Sitting in stretcher comfortably working on iPad  - Abdomen: Soft. Non-tender     Dennys Mederos  PGY-2, Obstetrics & Gynecology     d/w Dr. GULSHAN Garcia, MIS fellow     -------------------------------------------------------------------------------------    **930pm clinical update**    Patient underwent CT urogram, abdomen/pelvis with PO/IV/Rectal contrast, with results as follows:    < from: CT Abdomen and Pelvis Urogram w/wo IV Cont (24 @ 20:08) >  ACC: [id number] EXAM:  CT ABD PELV UROGRAM WAW IC   ORDERED BY:  GILDARDO QUINONES     PROCEDURE DATE:  2024      INTERPRETATION:  CLINICAL INFORMATION: Status post total laparoscopic   hysterectomy on 2024. Follow-up evaluation for bowel or ureter injury.    ADDITIONAL CLINICAL INFORMATION: Other, Non-specified    COMPARISON: Same day CT abdomen and pelvis 2024    CONTRAST/COMPLICATIONS:  IV Contrast: Omnipaque 350  90 cc administered   10 cc discarded  Oral Contrast: NONE  Complications: None reported at time of study completion    PROCEDURE:  CT of the Abdomen and Pelvis was performed.  Precontrast, Nephrographic and Excretory phases were acquired (CT   UROGRAM).  10 mg of Lasix was administered.  Sagittal and coronal reformats were performed.    FINDINGS:  LOWER CHEST: Mild bibasilar dependent atelectasis. Bilateral breast   implants.    LIVER: Left hepatic lobe cyst.  BILE DUCTS: Normal caliber.  GALLBLADDER: Within normal limits.  SPLEEN: Within normal limits.  PANCREAS: Within normal limits.  ADRENALS: Within normal limits.  KIDNEYS/URETERS: Kidneys enhance symmetrically without hydronephrosis or   focal renal parenchymal lesion. Contrast is seen in the renal collecting   systems and ureters to the level of the pelvic rim. Contrast is seen   within the urinary bladder. No evidence of ureteral injury. No   perinephric fluid or hematoma.    BLADDER: Excreted contrast.  REPRODUCTIVE ORGANS: Hysterectomy. Rounded low-attenuation lesions in the   bilateral adnexameasuring up to 1.4 cm on the right and 2.2 cm on the   left likely representing dominant follicles which were seen on ultrasound   from 2024.    BOWEL: Rectal tube. No bowel obstruction. Oral contrast within the small   bowel, colon, and rectumwithout evidence of extravasation. Thickening of   the sigmoid colon within the pelvis. Appendix is not visualized.  PERITONEUM: Small to moderate volume of complex fluid in the pelvis and   mesenteric edema with thick-walled loculated fluid collection measuring   5.5 x 1.9 cm (series 5:106). No pneumoperitoneum. No mesenteric   lymphadenopathy.  VESSELS: Within normal limits.  RETROPERITONEUM/LYMPH NODES: No retroperitoneal lymphadenopathy.  ABDOMINAL WALL: Postsurgical changes. Tiny fat-containing left inguinal   hernia.  BONES: Degenerative changes.    IMPRESSION:  No evidence of bowel or ureteral injury as clinically questioned.    Small to moderate volume of complex fluid in the pelvis and mesenteric   edema with thick-walled loculated fluid collection measuring 5.5 x 1.9 cm   (series 5:106) which could represent evolving/degenerating hematoma or   abscess. Reactive thickening of the sigmoid colon.    --- End of Report ---    MELISSA ANG MD; Resident Radiologist  This document has been electronically signed.  JACQUELINE ELIZABETH DO; Attending Radiologist  This document has been electronically signed. 2024  8:47PM    < end of copied text >    Impression/Recommendations:    - No bowel or ureteral injury. A fluid collection measuring 5.5x1.9cm in pelvis is visualized, with differential including remnants of surgical hemostatic agents applied intraoperatively vs abscess vs hematoma.  - Patient offered admission to GYN service for 24hrs IV antibiotics, after discussion between patient and attending, Dr. Keyes, patient opting for discharge to home with PO antibiotics  - Please send patient with Levaquin 500mg once daily x7 days and Metronidazole 500mg PO q12hrs x7 days  - Patient will follow up with Dr. Keyes as scheduled on 24  - Strict ED return precautions discussed  - Patient cleared for discharge to home from GYN perspective    Plan discussed with attending, Dr. Stephy Díaz PGY2  [id number]   54yo  POD#9 from [facility], BS, and Cysto for fibroid uterus who presented for reported fevers at home, 100.8*F. Patient otherwise not endorsing any symptoms and is afebrile here with reassuring VS. Patient without URI sx (negative for COVID, Flu, and RSV today) and exam is benign. TVUS obtained yesterday outpatient with a 2.7cm complex collection that is favored to be post-op and at this point, CT ordered by the ED is pending. WBC is downtrending at 11.3 which was 15.1 yesterday.     - Will follow-up UA and CT. Plan of care pending CT results     Addendum    Patient re-evaluated @1358. Patient reports minimal abdominal pain but is otherwise without complaints. Denies any history of loose stool or issues with BMs currently (stated potential loose stool several days ago). Reviewed plan of care for further evaluation of the bowels and ureter. Patient is amendable to plan of care. Currently awaiting radiology call-back to determine order of imaging to evaluate   - Gen: Sitting in stretcher comfortably working on iPad  - Abdomen: Soft. Non-tender     Dennys Mederos  PGY-2, Obstetrics & Gynecology     d/w Dr. GULSHAN Garcia, MIS fellow     -------------------------------------------------------------------------------------    **930pm clinical update**    Patient underwent CT urogram, abdomen/pelvis with PO/IV/Rectal contrast, with results as follows:    < from: CT Abdomen and Pelvis Urogram w/wo IV Cont (24 @ 20:08) >  ACC: [id number] EXAM:  CT ABD PELV UROGRAM WAW IC   ORDERED BY:  GILDARDO QUINONES     PROCEDURE DATE:  2024      INTERPRETATION:  CLINICAL INFORMATION: Status post total laparoscopic   hysterectomy on 2024. Follow-up evaluation for bowel or ureter injury.    ADDITIONAL CLINICAL INFORMATION: Other, Non-specified    COMPARISON: Same day CT abdomen and pelvis 2024    CONTRAST/COMPLICATIONS:  IV Contrast: Omnipaque 350  90 cc administered   10 cc discarded  Oral Contrast: NONE  Complications: None reported at time of study completion    PROCEDURE:  CT of the Abdomen and Pelvis was performed.  Precontrast, Nephrographic and Excretory phases were acquired (CT   UROGRAM).  10 mg of Lasix was administered.  Sagittal and coronal reformats were performed.    FINDINGS:  LOWER CHEST: Mild bibasilar dependent atelectasis. Bilateral breast   implants.    LIVER: Left hepatic lobe cyst.  BILE DUCTS: Normal caliber.  GALLBLADDER: Within normal limits.  SPLEEN: Within normal limits.  PANCREAS: Within normal limits.  ADRENALS: Within normal limits.  KIDNEYS/URETERS: Kidneys enhance symmetrically without hydronephrosis or   focal renal parenchymal lesion. Contrast is seen in the renal collecting   systems and ureters to the level of the pelvic rim. Contrast is seen   within the urinary bladder. No evidence of ureteral injury. No   perinephric fluid or hematoma.    BLADDER: Excreted contrast.  REPRODUCTIVE ORGANS: Hysterectomy. Rounded low-attenuation lesions in the   bilateral adnexameasuring up to 1.4 cm on the right and 2.2 cm on the   left likely representing dominant follicles which were seen on ultrasound   from 2024.    BOWEL: Rectal tube. No bowel obstruction. Oral contrast within the small   bowel, colon, and rectumwithout evidence of extravasation. Thickening of   the sigmoid colon within the pelvis. Appendix is not visualized.  PERITONEUM: Small to moderate volume of complex fluid in the pelvis and   mesenteric edema with thick-walled loculated fluid collection measuring   5.5 x 1.9 cm (series 5:106). No pneumoperitoneum. No mesenteric   lymphadenopathy.  VESSELS: Within normal limits.  RETROPERITONEUM/LYMPH NODES: No retroperitoneal lymphadenopathy.  ABDOMINAL WALL: Postsurgical changes. Tiny fat-containing left inguinal   hernia.  BONES: Degenerative changes.    IMPRESSION:  No evidence of bowel or ureteral injury as clinically questioned.    Small to moderate volume of complex fluid in the pelvis and mesenteric   edema with thick-walled loculated fluid collection measuring 5.5 x 1.9 cm   (series 5:106) which could represent evolving/degenerating hematoma or   abscess. Reactive thickening of the sigmoid colon.    --- End of Report ---    MELISSA ANG MD; Resident Radiologist  This document has been electronically signed.  JACQUELINE ELIZABETH DO; Attending Radiologist  This document has been electronically signed. 2024  8:47PM    < end of copied text >    Impression/Recommendations:    - No bowel or ureteral injury. A fluid collection measuring 5.5x1.9cm in pelvis is visualized, with differential including remnants of surgical hemostatic agents applied intraoperatively vs abscess vs hematoma.  - Patient offered admission to GYN service for 24hrs IV antibiotics, after discussion between patient and attending, Dr. Keyes, patient opting for discharge to home with PO antibiotics  - Please send patient with Levaquin 500mg once daily x7 days and Metronidazole 500mg PO q12hrs x7 days  - Patient will follow up with Dr. Keyes as scheduled on 24  - Strict ED return precautions discussed  - Patient cleared for discharge to home from GYN perspective    Plan discussed with attending, Dr. Stephy Díaz PGY2  [id number]

## 2024-01-13 NOTE — CONSULT NOTE ADULT - ATTENDING COMMENTS
54yo  [place] [facility] [place] for fibroid uterus who presents for post op fever    I agree with above resident's note with the following addition: CT scan showing 5.5cm complex fluid collection c/f abscess vs hematoma, no evidence of bowel/ureteral injury. Abdominal exam benign and vitals stable/pt afebrile throughout day without pain medication or antipyretics. Plan to discharge with levaquin/flagyl and advised patient to return if fevers recur; otherwise will follow up in office on Thursday.    D/w Dr. Keyes.    Kanwal Garcia MD  PGY-5, MIGS Fellow 52yo  POD#9 from [facility] [place], BS, and Cysto for fibroid uterus who presents for post op fever    I agree with above resident's note with the following addition: CT scan showing 5.5cm complex fluid collection c/f abscess vs hematoma, no evidence of bowel/ureteral injury. Abdominal exam benign and vitals stable/pt afebrile throughout day without pain medication or antipyretics. Plan to discharge with levaquin/flagyl and advised patient to return if fevers recur; otherwise will follow up in office on Thursday.    D/w Dr. Keyes.    Kanwal Garcia MD  PGY-5, MIGS Fellow

## 2024-01-13 NOTE — ED ADULT NURSE NOTE - OBJECTIVE STATEMENT
Pt states "hysterectomy on 1/4/24, last Wednesday had some cramping that stopped and on Thursday had fever, chills, sweats with max temp 101.7 took Motrin at 0130. Saw [facility] yesterday and told to come in."

## 2024-01-13 NOTE — ED PROVIDER NOTE - PATIENT PORTAL LINK FT
You can access the FollowMyHealth Patient Portal offered by [facility] by registering at the following website: [email or web address]. By joining [facility]’s FollowMyHealth portal, you will also be able to view your health information using other applications (apps) compatible with our system. You can access the FollowMyHealth Patient Portal offered by [facility] by registering at the following website: [email or web address]. By joining [facility]’s FollowMyHealth portal, you will also be able to view your health information using other applications (apps) compatible with our system.

## 2024-01-13 NOTE — ED ADULT NURSE NOTE - NSICDXPASTSURGICALHX_GEN_ALL_CORE_FT
PAST SURGICAL HISTORY:  H/O right mastectomy     History of dental surgery     S/P tonsillectomy childhood    S/P total hysterectomy

## 2024-01-13 NOTE — ED PROVIDER NOTE - CARE PROVIDERS DIRECT ADDRESSES
,[email or web address]

## 2024-01-14 LAB
CULTURE RESULTS: SIGNIFICANT CHANGE UP
CULTURE RESULTS: SIGNIFICANT CHANGE UP
SPECIMEN SOURCE: SIGNIFICANT CHANGE UP
SPECIMEN SOURCE: SIGNIFICANT CHANGE UP

## 2024-01-14 NOTE — ED POST DISCHARGE NOTE - RESULT SUMMARY
Incidental/recommended f/u list. CTAP performed recommended f/u CT w/ oral and possibly rectal contrast to exclude bowel or ureteral injury. Chart reviewed, pt already had this f/u CT performed. - Bro Keenan PA-C

## 2024-01-14 NOTE — ED POST DISCHARGE NOTE - DETAILS
1/15: patient left vm with question of allergic reaction. attempted to reach back at requested phone number with no answer. - Vero Godoy PA-C

## 2024-01-16 LAB
-  AMOXICILLIN/CLAVULANIC ACID: SIGNIFICANT CHANGE UP
-  AMPICILLIN/SULBACTAM: SIGNIFICANT CHANGE UP
-  AMPICILLIN: SIGNIFICANT CHANGE UP
-  AZTREONAM: SIGNIFICANT CHANGE UP
-  CEFAZOLIN: SIGNIFICANT CHANGE UP
-  CEFEPIME: SIGNIFICANT CHANGE UP
-  CEFOXITIN: SIGNIFICANT CHANGE UP
-  CEFTRIAXONE: SIGNIFICANT CHANGE UP
-  CEFUROXIME: SIGNIFICANT CHANGE UP
-  CIPROFLOXACIN: SIGNIFICANT CHANGE UP
-  ERTAPENEM: SIGNIFICANT CHANGE UP
-  GENTAMICIN: SIGNIFICANT CHANGE UP
-  IMIPENEM: SIGNIFICANT CHANGE UP
-  LEVOFLOXACIN: SIGNIFICANT CHANGE UP
-  MEROPENEM: SIGNIFICANT CHANGE UP
-  NITROFURANTOIN: SIGNIFICANT CHANGE UP
-  PIPERACILLIN/TAZOBACTAM: SIGNIFICANT CHANGE UP
-  TOBRAMYCIN: SIGNIFICANT CHANGE UP
-  TRIMETHOPRIM/SULFAMETHOXAZOLE: SIGNIFICANT CHANGE UP
CULTURE RESULTS: ABNORMAL
METHOD TYPE: SIGNIFICANT CHANGE UP
ORGANISM # SPEC MICROSCOPIC CNT: ABNORMAL
ORGANISM # SPEC MICROSCOPIC CNT: ABNORMAL
SPECIMEN SOURCE: SIGNIFICANT CHANGE UP

## 2024-01-17 LAB
BACTERIA UR CULT: NORMAL
BASOPHILS # BLD AUTO: 0.03 K/UL
BASOPHILS NFR BLD AUTO: 0.2 %
EOSINOPHIL # BLD AUTO: 0.1 K/UL
EOSINOPHIL NFR BLD AUTO: 0.7 %
HCT VFR BLD CALC: 34.6 %
HGB BLD-MCNC: 11.1 G/DL
IMM GRANULOCYTES NFR BLD AUTO: 0.5 %
LYMPHOCYTES # BLD AUTO: 0.86 K/UL
LYMPHOCYTES NFR BLD AUTO: 5.7 %
MAN DIFF?: NORMAL
MCHC RBC-ENTMCNC: 29.3 PG
MCHC RBC-ENTMCNC: 32.1 GM/DL
MCV RBC AUTO: 91.3 FL
MONOCYTES # BLD AUTO: 1.52 K/UL
MONOCYTES NFR BLD AUTO: 10.1 %
NEUTROPHILS # BLD AUTO: 12.53 K/UL
NEUTROPHILS NFR BLD AUTO: 82.8 %
PLATELET # BLD AUTO: 305 K/UL
RBC # BLD: 3.79 M/UL
RBC # FLD: 12.9 %
WBC # FLD AUTO: 15.12 K/UL

## 2024-01-18 ENCOUNTER — APPOINTMENT (OUTPATIENT)
Dept: OBGYN | Facility: CLINIC | Age: 54
End: 2024-01-18
Payer: COMMERCIAL

## 2024-01-18 VITALS
SYSTOLIC BLOOD PRESSURE: 115 MMHG | HEIGHT: 64 IN | DIASTOLIC BLOOD PRESSURE: 78 MMHG | WEIGHT: 126 LBS | BODY MASS INDEX: 21.51 KG/M2

## 2024-01-18 PROCEDURE — 99024 POSTOP FOLLOW-UP VISIT: CPT

## 2024-01-19 LAB
BASOPHILS # BLD AUTO: 0.09 K/UL
BASOPHILS NFR BLD AUTO: 0.7 %
EOSINOPHIL # BLD AUTO: 0.29 K/UL
EOSINOPHIL NFR BLD AUTO: 2.3 %
HCT VFR BLD CALC: 35.6 %
HGB BLD-MCNC: 11.7 G/DL
IMM GRANULOCYTES NFR BLD AUTO: 1.6 %
LYMPHOCYTES # BLD AUTO: 2.11 K/UL
LYMPHOCYTES NFR BLD AUTO: 16.4 %
MAN DIFF?: NORMAL
MCHC RBC-ENTMCNC: 30.5 PG
MCHC RBC-ENTMCNC: 32.9 GM/DL
MCV RBC AUTO: 92.7 FL
MONOCYTES # BLD AUTO: 1.08 K/UL
MONOCYTES NFR BLD AUTO: 8.4 %
NEUTROPHILS # BLD AUTO: 9.09 K/UL
NEUTROPHILS NFR BLD AUTO: 70.6 %
PLATELET # BLD AUTO: 602 K/UL
RBC # BLD: 3.84 M/UL
RBC # FLD: 13 %
WBC # FLD AUTO: 12.86 K/UL

## 2024-01-19 NOTE — HISTORY OF PRESENT ILLNESS
[Pain is well-controlled] : pain is well-controlled [Fever] : no fever [Chills] : no chills [Nausea] : no nausea [Vomiting] : no vomiting [Clean/Dry/Intact] : clean, dry and intact [Erythema] : not erythematous [None] : no vaginal bleeding [Normal] : normal [Pathology reviewed] : pathology reviewed [de-identified] : pt s/p post eval in er and discharged on levaquin and flagyl for possible early pelvic collection ot is without fever no n/v no abd pelvic pain no discharge

## 2024-01-25 ENCOUNTER — APPOINTMENT (OUTPATIENT)
Dept: OBGYN | Facility: CLINIC | Age: 54
End: 2024-01-25
Payer: COMMERCIAL

## 2024-01-25 VITALS
WEIGHT: 126 LBS | SYSTOLIC BLOOD PRESSURE: 106 MMHG | DIASTOLIC BLOOD PRESSURE: 71 MMHG | BODY MASS INDEX: 21.51 KG/M2 | HEIGHT: 64 IN

## 2024-01-25 PROCEDURE — 99024 POSTOP FOLLOW-UP VISIT: CPT

## 2024-01-28 NOTE — HISTORY OF PRESENT ILLNESS
[Pain is well-controlled] : pain is well-controlled [Fever] : no fever [Chills] : no chills [Nausea] : no nausea [Vomiting] : no vomiting [Clean/Dry/Intact] : clean, dry and intact [Erythema] : not erythematous [None] : no vaginal bleeding [Normal] : normal [Pathology reviewed] : pathology reviewed [de-identified] : pt doing well no fever no chills [de-identified] : abd soft nt nd

## 2024-02-02 ENCOUNTER — OUTPATIENT (OUTPATIENT)
Dept: OUTPATIENT SERVICES | Facility: HOSPITAL | Age: 54
LOS: 1 days | End: 2024-02-02
Payer: COMMERCIAL

## 2024-02-02 ENCOUNTER — RESULT REVIEW (OUTPATIENT)
Age: 54
End: 2024-02-02

## 2024-02-02 ENCOUNTER — APPOINTMENT (OUTPATIENT)
Dept: ULTRASOUND IMAGING | Facility: CLINIC | Age: 54
End: 2024-02-02
Payer: COMMERCIAL

## 2024-02-02 DIAGNOSIS — Z90.89 ACQUIRED ABSENCE OF OTHER ORGANS: Chronic | ICD-10-CM

## 2024-02-02 DIAGNOSIS — Z90.11 ACQUIRED ABSENCE OF RIGHT BREAST AND NIPPLE: Chronic | ICD-10-CM

## 2024-02-02 DIAGNOSIS — Z90.710 ACQUIRED ABSENCE OF BOTH CERVIX AND UTERUS: Chronic | ICD-10-CM

## 2024-02-02 DIAGNOSIS — R10.2 PELVIC AND PERINEAL PAIN: ICD-10-CM

## 2024-02-02 DIAGNOSIS — Z92.89 PERSONAL HISTORY OF OTHER MEDICAL TREATMENT: Chronic | ICD-10-CM

## 2024-02-02 PROCEDURE — 76856 US EXAM PELVIC COMPLETE: CPT | Mod: 26

## 2024-02-02 PROCEDURE — 76856 US EXAM PELVIC COMPLETE: CPT

## 2024-02-02 PROCEDURE — 76830 TRANSVAGINAL US NON-OB: CPT

## 2024-02-02 PROCEDURE — 76830 TRANSVAGINAL US NON-OB: CPT | Mod: 26

## 2024-02-08 ENCOUNTER — APPOINTMENT (OUTPATIENT)
Dept: OBGYN | Facility: CLINIC | Age: 54
End: 2024-02-08
Payer: COMMERCIAL

## 2024-02-08 VITALS
SYSTOLIC BLOOD PRESSURE: 114 MMHG | HEIGHT: 64 IN | BODY MASS INDEX: 21.17 KG/M2 | DIASTOLIC BLOOD PRESSURE: 79 MMHG | WEIGHT: 124 LBS

## 2024-02-08 PROCEDURE — 99024 POSTOP FOLLOW-UP VISIT: CPT

## 2024-02-09 ENCOUNTER — TRANSCRIPTION ENCOUNTER (OUTPATIENT)
Age: 54
End: 2024-02-09

## 2024-02-10 NOTE — HISTORY OF PRESENT ILLNESS
[Pain is well-controlled] : pain is well-controlled [Fever] : no fever [Chills] : no chills [Nausea] : no nausea [Clean/Dry/Intact] : clean, dry and intact [Vomiting] : no vomiting [Erythema] : not erythematous [None] : no vaginal bleeding [Normal] : normal [Pathology reviewed] : pathology reviewed [de-identified] : pt feels well sono small fluid collection almost resolved with ovarian cyst

## 2024-02-29 ENCOUNTER — APPOINTMENT (OUTPATIENT)
Dept: OBGYN | Facility: CLINIC | Age: 54
End: 2024-02-29
Payer: COMMERCIAL

## 2024-02-29 VITALS
HEIGHT: 64 IN | SYSTOLIC BLOOD PRESSURE: 110 MMHG | DIASTOLIC BLOOD PRESSURE: 69 MMHG | BODY MASS INDEX: 21.68 KG/M2 | WEIGHT: 127 LBS

## 2024-02-29 PROCEDURE — 99024 POSTOP FOLLOW-UP VISIT: CPT

## 2024-03-02 NOTE — HISTORY OF PRESENT ILLNESS
[Fever] : no fever [Pain is well-controlled] : pain is well-controlled [Chills] : no chills [Nausea] : no nausea [Clean/Dry/Intact] : clean, dry and intact [Vomiting] : no vomiting [Erythema] : not erythematous [Normal] : normal [None] : no vaginal bleeding [de-identified] : cuff intact [Pathology reviewed] : pathology reviewed

## 2024-12-17 ENCOUNTER — APPOINTMENT (OUTPATIENT)
Dept: PLASTIC SURGERY | Facility: CLINIC | Age: 54
End: 2024-12-17

## 2024-12-20 ENCOUNTER — APPOINTMENT (OUTPATIENT)
Dept: PLASTIC SURGERY | Facility: CLINIC | Age: 54
End: 2024-12-20
Payer: COMMERCIAL

## 2024-12-20 VITALS
DIASTOLIC BLOOD PRESSURE: 78 MMHG | HEART RATE: 68 BPM | HEIGHT: 64 IN | SYSTOLIC BLOOD PRESSURE: 115 MMHG | TEMPERATURE: 98.2 F | OXYGEN SATURATION: 97 % | WEIGHT: 126 LBS | BODY MASS INDEX: 21.51 KG/M2

## 2024-12-20 DIAGNOSIS — C50.919 MALIGNANT NEOPLASM OF UNSPECIFIED SITE OF UNSPECIFIED FEMALE BREAST: ICD-10-CM

## 2024-12-20 PROCEDURE — 99203 OFFICE O/P NEW LOW 30 MIN: CPT

## 2025-01-06 ENCOUNTER — NON-APPOINTMENT (OUTPATIENT)
Age: 55
End: 2025-01-06

## 2025-01-06 ENCOUNTER — APPOINTMENT (OUTPATIENT)
Dept: ORTHOPEDIC SURGERY | Facility: CLINIC | Age: 55
End: 2025-01-06
Payer: COMMERCIAL

## 2025-01-06 VITALS — BODY MASS INDEX: 21.51 KG/M2 | WEIGHT: 126 LBS | HEIGHT: 64 IN

## 2025-01-06 DIAGNOSIS — M19.049 PRIMARY OSTEOARTHRITIS, UNSPECIFIED HAND: ICD-10-CM

## 2025-01-06 PROCEDURE — 20600 DRAIN/INJ JOINT/BURSA W/O US: CPT | Mod: LT

## 2025-01-06 PROCEDURE — 99203 OFFICE O/P NEW LOW 30 MIN: CPT | Mod: 25

## 2025-01-06 PROCEDURE — 73130 X-RAY EXAM OF HAND: CPT | Mod: 50

## 2025-03-26 NOTE — H&P PST ADULT - FUNCTIONAL STATUS
----- Message from Althea sent at 3/26/2025 11:13 AM CDT -----  Contact: [facility] [phone]  [email or web address] Patient is calling for Medical Advice regarding:[facility] a [facility] back or [facility]:Call back Comments:[facility] would like a call back regarding pt medication. Pharmacy would like a call back today Please advise patient replies from provider may take up to 48 hours.   4-10 METS

## (undated) DEVICE — SUT POLYSORB 0 30" GS-23

## (undated) DEVICE — POSITIONER PINK PAD PIGAZZI SYSTEM

## (undated) DEVICE — FOLEY TRAY 16FR LF URINE METER SURESTEP

## (undated) DEVICE — TUBING STRYKEFLOW II SUCTION / IRRIGATOR

## (undated) DEVICE — SUT VLOC 180 0 12" GS-21 GREEN

## (undated) DEVICE — VENODYNE/SCD SLEEVE CALF LARGE

## (undated) DEVICE — BLADE SCALPEL SAFETYLOCK #15

## (undated) DEVICE — DRAPE TOWEL BLUE 17" X 24"

## (undated) DEVICE — MARKING PEN W RULER

## (undated) DEVICE — UTERINE MANIPULATOR CLINICAL INNOVATIONS CLEARVIEW 7CM

## (undated) DEVICE — TROCAR GELPOINT MINI ADVANCED

## (undated) DEVICE — NDL HYPO REGULAR BEVEL 22G X 1.5" (TURQUOISE)

## (undated) DEVICE — ELCTR BOVIE PENCIL SMOKE EVACUATION

## (undated) DEVICE — GLV 7 PROTEXIS (WHITE)

## (undated) DEVICE — SUT BIOSYN 4-0 18" P-12

## (undated) DEVICE — LIGASURE BLUNT TIP 37CM

## (undated) DEVICE — TUBING TUR 2 PRONG

## (undated) DEVICE — PACK CYSTO

## (undated) DEVICE — SUT VLOC 180 0 18" GS-21 GREEN

## (undated) DEVICE — DRAPE INSTRUMENT POUCH 6.75" X 11"

## (undated) DEVICE — TROCAR APPLIED MEDICAL KII BALLOON BLUNT TIP 12MM X 100MM

## (undated) DEVICE — GOWN TRIMAX LG

## (undated) DEVICE — SOL IRR POUR H2O 250ML

## (undated) DEVICE — SOL IRR POUR NS 0.9% 500ML

## (undated) DEVICE — UTERINE MANIPULATOR COOPER SURGICAL 5MM 33CM GREEN

## (undated) DEVICE — SYR LUER LOK 10CC

## (undated) DEVICE — DRAPE 3/4 SHEET W REINFORCEMENT 56X77"

## (undated) DEVICE — DRAPE LIGHT HANDLE COVER (BLUE)

## (undated) DEVICE — POSITIONER FOAM EGG CRATE ULNAR 2PCS (PINK)

## (undated) DEVICE — OLYMPUS PK SPATULA 5MM

## (undated) DEVICE — TUBING INSUFFLATION LAP FILTER 10FT

## (undated) DEVICE — TROCAR COVIDIEN VERSASTEP PLUS 11MM STANDARD

## (undated) DEVICE — INSUFFLATION NDL COVIDIEN STEP 14G FOR STEP/VERSASTEP

## (undated) DEVICE — Device

## (undated) DEVICE — WARMING BLANKET UPPER ADULT

## (undated) DEVICE — ENDOCATCH II 15MM

## (undated) DEVICE — APPLICATOR VISTASEAL LAP DUAL 35CM RIGID

## (undated) DEVICE — PREP CHLOROHEXIDINE 4% 118CC KIT

## (undated) DEVICE — MEDICATION LABELS W MARKER

## (undated) DEVICE — ENDOCATCH 10MM SPECIMEN POUCH

## (undated) DEVICE — PREP CHLORAPREP HI-LITE ORANGE 26ML

## (undated) DEVICE — DRAPE MAYO STAND 30"

## (undated) DEVICE — BLADE SCALPEL SAFETYLOCK #10

## (undated) DEVICE — TROCAR COVIDIEN VERSASTEP PLUS 15MM STANDARD

## (undated) DEVICE — UTERINE MANIPULATOR CONMED VCARE LG 37MM

## (undated) DEVICE — GLV 7.5 PROTEXIS (WHITE)

## (undated) DEVICE — PACK GYN LAPAROSCOPY

## (undated) DEVICE — UTERINE MANIPULATOR CONMED VCARE SM 32MM

## (undated) DEVICE — GLV 6.5 PROTEXIS (WHITE)

## (undated) DEVICE — DRSG STERISTRIPS 0.5 X 4"

## (undated) DEVICE — RUMI TIP BLUE 6.7MM X 8CM

## (undated) DEVICE — POSITIONER PURPLE ARM ONE STEP (LARGE)

## (undated) DEVICE — UTERINE MANIPULATOR CONMED VCARE MED 34MM

## (undated) DEVICE — APPLICATOR FOR ARISTA XL 38CM

## (undated) DEVICE — SPECIMEN CONTAINER 100ML

## (undated) DEVICE — TROCAR COVIDIEN VERSASTEP PLUS 12MM STANDARD

## (undated) DEVICE — TROCAR COVIDIEN BLUNT TIP HASSAN 10MM

## (undated) DEVICE — SUT VLOC 180 0 9" GS-21 GREEN

## (undated) DEVICE — TUBING SMOKE EVACUATOR